# Patient Record
Sex: FEMALE | Race: WHITE | Employment: FULL TIME | URBAN - METROPOLITAN AREA
[De-identification: names, ages, dates, MRNs, and addresses within clinical notes are randomized per-mention and may not be internally consistent; named-entity substitution may affect disease eponyms.]

---

## 2017-08-18 PROBLEM — R06.02 SHORTNESS OF BREATH: Status: ACTIVE | Noted: 2017-08-18

## 2017-08-18 PROBLEM — I10 HTN (HYPERTENSION), BENIGN: Status: ACTIVE | Noted: 2017-08-18

## 2017-08-18 PROBLEM — N28.9 RENAL INSUFFICIENCY: Status: ACTIVE | Noted: 2017-08-18

## 2017-08-18 PROBLEM — R07.1 CHEST PAIN ON BREATHING: Status: ACTIVE | Noted: 2017-08-18

## 2017-08-18 PROBLEM — E11.51 TYPE 2 DIABETES MELLITUS WITH DIABETIC PERIPHERAL ANGIOPATHY WITHOUT GANGRENE, WITHOUT LONG-TERM CURRENT USE OF INSULIN (HCC): Status: ACTIVE | Noted: 2017-08-18

## 2018-01-26 PROBLEM — E78.2 MIXED HYPERLIPIDEMIA: Status: ACTIVE | Noted: 2018-01-26

## 2018-01-26 PROBLEM — I50.32 CHRONIC DIASTOLIC (CONGESTIVE) HEART FAILURE (HCC): Status: ACTIVE | Noted: 2018-01-26

## 2018-01-26 PROBLEM — E66.01 MORBID OBESITY (HCC): Status: ACTIVE | Noted: 2018-01-26

## 2018-05-07 ENCOUNTER — APPOINTMENT (OUTPATIENT)
Dept: ULTRASOUND IMAGING | Age: 63
End: 2018-05-07
Attending: EMERGENCY MEDICINE
Payer: COMMERCIAL

## 2018-05-07 ENCOUNTER — APPOINTMENT (OUTPATIENT)
Dept: NUCLEAR MEDICINE | Facility: HOSPITAL | Age: 63
End: 2018-05-07
Attending: INTERNAL MEDICINE
Payer: COMMERCIAL

## 2018-05-07 ENCOUNTER — APPOINTMENT (OUTPATIENT)
Dept: GENERAL RADIOLOGY | Age: 63
End: 2018-05-07
Attending: EMERGENCY MEDICINE
Payer: COMMERCIAL

## 2018-05-07 ENCOUNTER — HOSPITAL ENCOUNTER (OUTPATIENT)
Facility: HOSPITAL | Age: 63
Setting detail: OBSERVATION
Discharge: HOME | End: 2018-05-08
Attending: EMERGENCY MEDICINE | Admitting: HOSPITALIST
Payer: COMMERCIAL

## 2018-05-07 DIAGNOSIS — J40 BRONCHITIS: Primary | ICD-10-CM

## 2018-05-07 DIAGNOSIS — R07.89 CHEST PAIN, ATYPICAL: ICD-10-CM

## 2018-05-07 DIAGNOSIS — D64.9 ANEMIA, UNSPECIFIED TYPE: ICD-10-CM

## 2018-05-07 DIAGNOSIS — R79.89 ELEVATED D-DIMER: ICD-10-CM

## 2018-05-07 DIAGNOSIS — N28.9 RENAL INSUFFICIENCY: ICD-10-CM

## 2018-05-07 DIAGNOSIS — M79.672 LEFT FOOT PAIN: ICD-10-CM

## 2018-05-07 DIAGNOSIS — J98.01 BRONCHOSPASM: ICD-10-CM

## 2018-05-07 DIAGNOSIS — E66.01 MORBID OBESITY (HCC): ICD-10-CM

## 2018-05-07 DIAGNOSIS — E11.65 TYPE 2 DIABETES MELLITUS WITH HYPERGLYCEMIA, UNSPECIFIED WHETHER LONG TERM INSULIN USE (HCC): ICD-10-CM

## 2018-05-07 PROBLEM — I10 BENIGN ESSENTIAL HTN: Status: ACTIVE | Noted: 2017-08-18

## 2018-05-07 PROCEDURE — 99220 INITIAL OBSERVATION CARE,LEVL III: CPT | Performed by: INTERNAL MEDICINE

## 2018-05-07 PROCEDURE — 78582 LUNG VENTILAT&PERFUS IMAGING: CPT | Performed by: INTERNAL MEDICINE

## 2018-05-07 PROCEDURE — 71045 X-RAY EXAM CHEST 1 VIEW: CPT | Performed by: EMERGENCY MEDICINE

## 2018-05-07 PROCEDURE — 93970 EXTREMITY STUDY: CPT | Performed by: EMERGENCY MEDICINE

## 2018-05-07 PROCEDURE — 73630 X-RAY EXAM OF FOOT: CPT | Performed by: EMERGENCY MEDICINE

## 2018-05-07 RX ORDER — FUROSEMIDE 40 MG/1
40 TABLET ORAL
Status: DISCONTINUED | OUTPATIENT
Start: 2018-05-07 | End: 2018-05-08

## 2018-05-07 RX ORDER — ALBUTEROL SULFATE 90 UG/1
AEROSOL, METERED RESPIRATORY (INHALATION) EVERY 6 HOURS PRN
COMMUNITY
End: 2019-08-02 | Stop reason: ALTCHOICE

## 2018-05-07 RX ORDER — ONDANSETRON 2 MG/ML
4 INJECTION INTRAMUSCULAR; INTRAVENOUS EVERY 6 HOURS PRN
Status: DISCONTINUED | OUTPATIENT
Start: 2018-05-07 | End: 2018-05-08

## 2018-05-07 RX ORDER — FERROUS SULFATE TAB EC 324 MG (65 MG FE EQUIVALENT) 324 (65 FE) MG
TABLET DELAYED RESPONSE ORAL
COMMUNITY
End: 2021-08-20 | Stop reason: ALTCHOICE

## 2018-05-07 RX ORDER — ACETAMINOPHEN 325 MG/1
650 TABLET ORAL EVERY 6 HOURS PRN
Status: DISCONTINUED | OUTPATIENT
Start: 2018-05-07 | End: 2018-05-08

## 2018-05-07 RX ORDER — METOCLOPRAMIDE HYDROCHLORIDE 5 MG/ML
10 INJECTION INTRAMUSCULAR; INTRAVENOUS EVERY 8 HOURS PRN
Status: DISCONTINUED | OUTPATIENT
Start: 2018-05-07 | End: 2018-05-08

## 2018-05-07 RX ORDER — IBUPROFEN 400 MG/1
400 TABLET ORAL EVERY 4 HOURS PRN
Status: DISCONTINUED | OUTPATIENT
Start: 2018-05-07 | End: 2018-05-07

## 2018-05-07 RX ORDER — IBUPROFEN 400 MG/1
200 TABLET ORAL EVERY 4 HOURS PRN
Status: DISCONTINUED | OUTPATIENT
Start: 2018-05-07 | End: 2018-05-07

## 2018-05-07 RX ORDER — CLONIDINE HYDROCHLORIDE 0.2 MG/1
0.3 TABLET ORAL 3 TIMES DAILY
Status: DISCONTINUED | OUTPATIENT
Start: 2018-05-07 | End: 2018-05-08

## 2018-05-07 RX ORDER — IPRATROPIUM BROMIDE AND ALBUTEROL SULFATE 2.5; .5 MG/3ML; MG/3ML
3 SOLUTION RESPIRATORY (INHALATION) EVERY 30 MIN PRN
Status: DISCONTINUED | OUTPATIENT
Start: 2018-05-07 | End: 2018-05-08

## 2018-05-07 RX ORDER — HYDROMORPHONE HYDROCHLORIDE 1 MG/ML
0.5 INJECTION, SOLUTION INTRAMUSCULAR; INTRAVENOUS; SUBCUTANEOUS EVERY 30 MIN PRN
Status: DISCONTINUED | OUTPATIENT
Start: 2018-05-07 | End: 2018-05-08

## 2018-05-07 RX ORDER — OXYBUTYNIN CHLORIDE 5 MG/1
5 TABLET ORAL 3 TIMES DAILY
Status: DISCONTINUED | OUTPATIENT
Start: 2018-05-07 | End: 2018-05-08

## 2018-05-07 RX ORDER — IBUPROFEN 400 MG/1
600 TABLET ORAL EVERY 4 HOURS PRN
Status: DISCONTINUED | OUTPATIENT
Start: 2018-05-07 | End: 2018-05-07

## 2018-05-07 RX ORDER — ENOXAPARIN SODIUM 150 MG/ML
120 INJECTION SUBCUTANEOUS ONCE
Status: COMPLETED | OUTPATIENT
Start: 2018-05-07 | End: 2018-05-07

## 2018-05-07 RX ORDER — FLUTICASONE PROPIONATE 50 MCG
1 SPRAY, SUSPENSION (ML) NASAL DAILY
Status: DISCONTINUED | OUTPATIENT
Start: 2018-05-08 | End: 2018-05-08

## 2018-05-07 RX ORDER — CARVEDILOL 12.5 MG/1
12.5 TABLET ORAL
Status: DISCONTINUED | OUTPATIENT
Start: 2018-05-07 | End: 2018-05-08

## 2018-05-07 RX ORDER — METHYLPREDNISOLONE SODIUM SUCCINATE 125 MG/2ML
80 INJECTION, POWDER, LYOPHILIZED, FOR SOLUTION INTRAMUSCULAR; INTRAVENOUS EVERY 8 HOURS
Status: DISCONTINUED | OUTPATIENT
Start: 2018-05-07 | End: 2018-05-08

## 2018-05-07 RX ORDER — GARLIC EXTRACT 500 MG
1 CAPSULE ORAL 2 TIMES DAILY
Status: DISCONTINUED | OUTPATIENT
Start: 2018-05-07 | End: 2018-05-08

## 2018-05-07 RX ORDER — HEPARIN SODIUM 5000 [USP'U]/ML
7500 INJECTION, SOLUTION INTRAVENOUS; SUBCUTANEOUS EVERY 8 HOURS SCHEDULED
Status: DISCONTINUED | OUTPATIENT
Start: 2018-05-07 | End: 2018-05-08

## 2018-05-07 RX ORDER — SODIUM CHLORIDE 9 MG/ML
125 INJECTION, SOLUTION INTRAVENOUS CONTINUOUS
Status: DISCONTINUED | OUTPATIENT
Start: 2018-05-07 | End: 2018-05-08

## 2018-05-07 RX ORDER — LOSARTAN POTASSIUM 50 MG/1
50 TABLET ORAL DAILY
Status: DISCONTINUED | OUTPATIENT
Start: 2018-05-07 | End: 2018-05-08

## 2018-05-07 RX ORDER — BUPROPION HYDROCHLORIDE 150 MG/1
150 TABLET, EXTENDED RELEASE ORAL 2 TIMES DAILY
Status: DISCONTINUED | OUTPATIENT
Start: 2018-05-07 | End: 2018-05-08

## 2018-05-07 RX ORDER — MONTELUKAST SODIUM 10 MG/1
10 TABLET ORAL NIGHTLY
Status: DISCONTINUED | OUTPATIENT
Start: 2018-05-07 | End: 2018-05-08

## 2018-05-07 RX ORDER — HYDRALAZINE HYDROCHLORIDE 50 MG/1
100 TABLET, FILM COATED ORAL EVERY 8 HOURS SCHEDULED
Status: DISCONTINUED | OUTPATIENT
Start: 2018-05-07 | End: 2018-05-08

## 2018-05-07 RX ORDER — METHYLPREDNISOLONE SODIUM SUCCINATE 125 MG/2ML
125 INJECTION, POWDER, LYOPHILIZED, FOR SOLUTION INTRAMUSCULAR; INTRAVENOUS ONCE
Status: COMPLETED | OUTPATIENT
Start: 2018-05-07 | End: 2018-05-07

## 2018-05-07 RX ORDER — DEXTROSE MONOHYDRATE 25 G/50ML
50 INJECTION, SOLUTION INTRAVENOUS
Status: DISCONTINUED | OUTPATIENT
Start: 2018-05-07 | End: 2018-05-08

## 2018-05-07 RX ORDER — IPRATROPIUM BROMIDE AND ALBUTEROL SULFATE 2.5; .5 MG/3ML; MG/3ML
3 SOLUTION RESPIRATORY (INHALATION) EVERY 4 HOURS
Status: DISCONTINUED | OUTPATIENT
Start: 2018-05-07 | End: 2018-05-08

## 2018-05-07 RX ORDER — ASPIRIN 81 MG/1
81 TABLET ORAL DAILY
Status: DISCONTINUED | OUTPATIENT
Start: 2018-05-08 | End: 2018-05-08

## 2018-05-07 RX ORDER — HYDROCODONE BITARTRATE AND ACETAMINOPHEN 10; 325 MG/1; MG/1
1 TABLET ORAL 2 TIMES DAILY PRN
Status: DISCONTINUED | OUTPATIENT
Start: 2018-05-07 | End: 2018-05-08

## 2018-05-07 NOTE — PROGRESS NOTES
[facility] Progress Note:  Anticoagulation Weight Dose Adjustment for heparin    Leticia Huang is a 66year old female who has been prescribed heparin for VTE prophylaxis.       Estimated Creatinine Clearance: 23.2 mL/min (A) (based on SCr of 2

## 2018-05-07 NOTE — ED PROVIDER NOTES
Patient Seen in: THE [facility] Emergency Department In [place]    History   Patient presents with:  Chest Pain Angina (cardiovascular)    Stated Complaint: CHEST PAIN AND DENNY     HPI    Patient has a history of COPD as well as diabetes, morbid obesity, renal HPI.  Constitutional and vital signs reviewed. All other systems reviewed and negative except as noted above.     Physical Exam   ED Triage Vitals [05/07/18 1259]  BP: (!) 161/54  Pulse: 74  Resp: 20  Temp: 98.2 °F (36.8 °C)  Temp src: Oral  SpO2: 100 FEU = Fibrinogen Equivalent Units.     D-Dimer results of less than 0.5 ug/mL (FEU) have been shown to contribute to the exclusion of venous thromboembolism with a negative predictive value of approximately 95% when results are used as part of the total cli With her calf pain and swelling DVT and pulmonary embolism will need to be excluded. I suspect patient may have gout in the foot. Occult fracture is also considered. Patient treated with IV fluid, DuoNeb's, and Solu-Medrol.   She was offered narcotic p Prescribed:  Current Discharge Medication List        Present on Admission           ICD-10-CM Noted POA    Bronchitis J40 5/7/2018 Unknown

## 2018-05-07 NOTE — PROGRESS NOTES
NURSING ADMISSION NOTE      Patient admitted via Wheelchair  Oriented to room. Safety precautions initiated. Bed in low position. Call light in reach.     Admission navigator complete

## 2018-05-07 NOTE — H&P
[facility] HOSPITAL[facility]  History and Physical     Shayla Delaware Patient Status:  Emergency    1955 MRN [id number]   Location [address] [facility] Attending Chucho Evans MD   Hosp Day # 0 PCP Chetna Gonzalez MD     Ch Breath Activated 1 puff as needed. Disp:  Rfl:    CloNIDine HCl 0.3 MG Oral Tab Take 0.3 mg by mouth 3 (three) times daily. Disp:  Rfl:    FLUTICASONE PROPIONATE NA by Nasal route as needed.  Disp:  Rfl:    carvedilol 12.5 MG Oral Tab Take 1 tablet (12.5 bruits. Respiratory: decreased BS, minimal wheezes  Cardiovascular: S1, S2. Regular rate and rhythm. No murmurs, rubs or gallops. Equal pulses. Chest and Back: No tenderness or deformity. Abdomen: Obese, Soft, nontender, nondistended.   Positive bowel s ED  · CODE status: FUll  · Painter: none    Plan of care discussed with patient    Darin Haider MD  5/7/2018

## 2018-05-07 NOTE — ED INITIAL ASSESSMENT (HPI)
CHEST PAIN WITH DIFF BREATHING-- JUST FINISHED ANTIBIOTIC FOR URI A COUPLE OF DAYS AGO AND SYMPTOMS RETURNED YESTERDAY

## 2018-05-08 VITALS
BODY MASS INDEX: 44.41 KG/M2 | SYSTOLIC BLOOD PRESSURE: 143 MMHG | DIASTOLIC BLOOD PRESSURE: 55 MMHG | OXYGEN SATURATION: 96 % | TEMPERATURE: 98 F | HEART RATE: 72 BPM | HEIGHT: 68 IN | WEIGHT: 293 LBS | RESPIRATION RATE: 21 BRPM

## 2018-05-08 PROCEDURE — 99217 OBSERVATION CARE DISCHARGE: CPT | Performed by: HOSPITALIST

## 2018-05-08 RX ORDER — METHYLPREDNISOLONE 4 MG/1
TABLET ORAL
Qty: 21 TABLET | Refills: 0 | Status: SHIPPED | OUTPATIENT
Start: 2018-05-08 | End: 2018-08-03

## 2018-05-08 RX ORDER — METOCLOPRAMIDE HYDROCHLORIDE 5 MG/ML
5 INJECTION INTRAMUSCULAR; INTRAVENOUS EVERY 8 HOURS PRN
Status: DISCONTINUED | OUTPATIENT
Start: 2018-05-08 | End: 2018-05-08

## 2018-05-08 RX ORDER — PREDNISONE 10 MG/1
TABLET ORAL
Qty: 30 TABLET | Refills: 0 | Status: SHIPPED | OUTPATIENT
Start: 2018-05-08 | End: 2018-08-03

## 2018-05-08 RX ORDER — PREDNISONE 20 MG/1
40 TABLET ORAL
Status: DISCONTINUED | OUTPATIENT
Start: 2018-05-09 | End: 2018-05-08

## 2018-05-08 RX ORDER — GLIMEPIRIDE 2 MG/1
TABLET ORAL
Refills: 0 | Status: SHIPPED | COMMUNITY
Start: 2018-05-08

## 2018-05-08 NOTE — CM/SW NOTE
SW received protocol order for home health. Pt was screened during rounds and no needs are identified at this time. RN to contact SW/CM if needs arise. 05/08/18 1100   CM/SW Screening   Referral [phone] [facility] staff; Chart

## 2018-05-08 NOTE — RESPIRATORY THERAPY NOTE
MOHAN - Equipment Use Daily Summary:                  . Set Mode: CPAP WITH C-FLEX                . Usage in hours: 0:51                . 90% Pressure (EPAP) level: 16                . 90% Insp. Pressure (IPAP): Jean Dickey AHI: 0                .  Supple

## 2018-05-08 NOTE — BH LEVEL OF CARE ASSESSMENT
Level of Care Assessment Note    General Questions  Precipitating Events: Patient was referred to courtney liaison due to grief over the loss of her dog/stress. History of Present Illness:  The pt admits to a history of stress due to her job, finances and her h Sadness; Optimistic;Depressed;Calm;Stressed  Appropriateness of Affect: Appropriate to situation  Range of Affect: Normal  Stability of Affect: Stable  Attitude toward staff: Co-operative;Open;Pleasant  Speech  Rate of Speech: Appropriate  Flow of Speech: A

## 2018-05-08 NOTE — PROGRESS NOTES
NURSING DISCHARGE NOTE    Discharged Home via Wheelchair. Accompanied by Spouse  Belongings Taken by patient/family. Discharge paperwork reviewed with pt and spouse. All questions answered.  Verbalized understanding  All belongings taken with patien

## 2018-05-08 NOTE — PROGRESS NOTES
[facility]  Progress Note     Kieran Silvestre Patient Status:  Observation    1955 MRN [id number]   [facility] 3NE-A Attending Carolina Fowler MD   Hosp Day # 0 PCP Arthur Castañeda MD     Chief Complaint: SOB    S: Patie 1318   TROP  <0.046            Imaging: Imaging data reviewed in Epic.     Medications:   • aspirin  81 mg Oral Daily   • BuPROPion HCl ER (SR)  150 mg Oral BID   • carvedilol  12.5 mg Oral 2x Daily(Beta Blocker)   • CloNIDine HCl  0.3 mg Oral TID   • Fluti of care:   VQ- low prob   US neg DVT baker's cyst left   RVP neg  Blood cx P   Sputum P   IV steroids  Consider decreasing dose   Psych liaison to see     Estimated date of discharge: TBD  Discharge is dependent on: improvement in breathing  At this point

## 2018-05-08 NOTE — PLAN OF CARE
Patient is A&Ox4  Complained of L foot pain  No n/v/d  Uses a motorized wheelchair   IVF infusing  On droplet iso to r/o flu - respiratory panel is still pending  Blood culture pending  Afebrile, VSS  NSR on tele; on room air  MOHAN - CPAP at night  Will con

## 2018-05-09 NOTE — DISCHARGE SUMMARY
[facility]OSPITAL[facility]  DISCHARGE SUMMARY     Glenna Dueñas Patient Status:  Observation    1955 MRN [id number]   [facility] 3NE-A Attending No att. providers found   Hosp Day # 0 PCP Brittney [facility] MD     Date of Admission:  symtpoms and came if as she continues to have cough, chest pressure and SOB. She denies any CP with exertion. She denies any fevers or chills. She denies any recent travel but does complain of L foot pain.  She states it feels similar to when she had gout i around her own house. Is unable to walk from car to store entrance. Discharged home today. She did quit smoking almost 1 year ago 1 positive for her, out of herself that she was able to quit after many years of tobacco dependency.         Procedures du Take 20 mg by mouth daily. Refills:  0     BuPROPion HCl ER (SR) 150 MG Tb12  Commonly known as:  WELLBUTRIN SR      Take 1 tablet by mouth 2 (two) times daily.    Refills:  0     carvedilol 12.5 MG Tabs  Commonly known as:  COREG      Take 1 tablet (12.5 These medications were sent to [facility], [address], [address], [address]    Phone:  [phone]   · methylPREDNISolone 4 MG Tbpk     Please  your prescriptio

## 2019-08-02 PROBLEM — I10 HTN (HYPERTENSION), BENIGN: Status: ACTIVE | Noted: 2019-08-02

## 2019-08-02 PROBLEM — N18.6 ESRD (END STAGE RENAL DISEASE) (HCC): Status: ACTIVE | Noted: 2019-08-02

## 2020-11-17 PROBLEM — Z01.810 PREOP CARDIOVASCULAR EXAM: Status: ACTIVE | Noted: 2020-11-17

## 2025-04-04 ENCOUNTER — HOSPITAL ENCOUNTER (EMERGENCY)
Facility: HOSPITAL | Age: 70
Discharge: STILL ADMITTED OR ADMINISTRATIVE | End: 2025-04-04
Payer: COMMERCIAL

## 2025-04-04 ENCOUNTER — HOSPITAL ENCOUNTER (INPATIENT)
Facility: HOSPITAL | Age: 70
LOS: 5 days | Discharge: NURSING, REHAB OR LONG-TERM CARE | End: 2025-04-11
Attending: EMERGENCY MEDICINE | Admitting: STUDENT IN AN ORGANIZED HEALTH CARE EDUCATION/TRAINING PROGRAM
Payer: MEDICARE

## 2025-04-04 ENCOUNTER — APPOINTMENT (OUTPATIENT)
Dept: CT IMAGING | Facility: HOSPITAL | Age: 70
End: 2025-04-04
Attending: EMERGENCY MEDICINE
Payer: MEDICARE

## 2025-04-04 VITALS
HEIGHT: 68 IN | OXYGEN SATURATION: 95 % | SYSTOLIC BLOOD PRESSURE: 174 MMHG | TEMPERATURE: 99 F | DIASTOLIC BLOOD PRESSURE: 54 MMHG | HEART RATE: 101 BPM | BODY MASS INDEX: 22.73 KG/M2 | WEIGHT: 150 LBS | RESPIRATION RATE: 17 BRPM

## 2025-04-04 DIAGNOSIS — L03.115 CELLULITIS OF RIGHT LOWER EXTREMITY: Primary | ICD-10-CM

## 2025-04-04 DIAGNOSIS — R10.9 ABDOMINAL PAIN OF UNKNOWN ETIOLOGY: ICD-10-CM

## 2025-04-04 LAB
ALBUMIN SERPL-MCNC: 4 G/DL (ref 3.2–4.8)
ALBUMIN/GLOB SERPL: 1.4 {RATIO} (ref 1–2)
ALP LIVER SERPL-CCNC: 71 U/L
ALT SERPL-CCNC: 13 U/L
ANION GAP SERPL CALC-SCNC: 8 MMOL/L (ref 0–18)
AST SERPL-CCNC: 15 U/L (ref ?–34)
BASOPHILS # BLD AUTO: 0.03 X10(3) UL (ref 0–0.2)
BASOPHILS NFR BLD AUTO: 0.2 %
BILIRUB SERPL-MCNC: <0.2 MG/DL (ref 0.2–1.1)
BUN BLD-MCNC: 29 MG/DL (ref 9–23)
CALCIUM BLD-MCNC: 10.5 MG/DL (ref 8.7–10.6)
CHLORIDE SERPL-SCNC: 99 MMOL/L (ref 98–112)
CO2 SERPL-SCNC: 29 MMOL/L (ref 21–32)
CREAT BLD-MCNC: 3.57 MG/DL
EGFRCR SERPLBLD CKD-EPI 2021: 13 ML/MIN/1.73M2 (ref 60–?)
EOSINOPHIL # BLD AUTO: 0.08 X10(3) UL (ref 0–0.7)
EOSINOPHIL NFR BLD AUTO: 0.6 %
ERYTHROCYTE [DISTWIDTH] IN BLOOD BY AUTOMATED COUNT: 15.1 %
ETHANOL SERPL-MCNC: 3 MG/DL (ref ?–3)
FLUAV + FLUBV RNA SPEC NAA+PROBE: NEGATIVE
FLUAV + FLUBV RNA SPEC NAA+PROBE: NEGATIVE
GLOBULIN PLAS-MCNC: 2.9 G/DL (ref 2–3.5)
GLUCOSE BLD-MCNC: 120 MG/DL (ref 70–99)
HCT VFR BLD AUTO: 34.1 %
HGB BLD-MCNC: 10.6 G/DL
IMM GRANULOCYTES # BLD AUTO: 0.09 X10(3) UL (ref 0–1)
IMM GRANULOCYTES NFR BLD: 0.7 %
LACTATE SERPL-SCNC: 1 MMOL/L (ref 0.5–2)
LIPASE SERPL-CCNC: 30 U/L (ref 12–53)
LYMPHOCYTES # BLD AUTO: 0.19 X10(3) UL (ref 1–4)
LYMPHOCYTES NFR BLD AUTO: 1.5 %
MCH RBC QN AUTO: 32.2 PG (ref 26–34)
MCHC RBC AUTO-ENTMCNC: 31.1 G/DL (ref 31–37)
MCV RBC AUTO: 103.6 FL
MONOCYTES # BLD AUTO: 0.62 X10(3) UL (ref 0.1–1)
MONOCYTES NFR BLD AUTO: 4.8 %
NEUTROPHILS # BLD AUTO: 11.95 X10 (3) UL (ref 1.5–7.7)
NEUTROPHILS # BLD AUTO: 11.95 X10(3) UL (ref 1.5–7.7)
NEUTROPHILS NFR BLD AUTO: 92.2 %
OSMOLALITY SERPL CALC.SUM OF ELEC: 289 MOSM/KG (ref 275–295)
PLATELET # BLD AUTO: 248 10(3)UL (ref 150–450)
POTASSIUM SERPL-SCNC: 5.1 MMOL/L (ref 3.5–5.1)
PROT SERPL-MCNC: 6.9 G/DL (ref 5.7–8.2)
RBC # BLD AUTO: 3.29 X10(6)UL
RSV RNA SPEC NAA+PROBE: NEGATIVE
SARS-COV-2 RNA RESP QL NAA+PROBE: NOT DETECTED
SODIUM SERPL-SCNC: 136 MMOL/L (ref 136–145)
WBC # BLD AUTO: 13 X10(3) UL (ref 4–11)

## 2025-04-04 PROCEDURE — 83605 ASSAY OF LACTIC ACID: CPT

## 2025-04-04 PROCEDURE — 74176 CT ABD & PELVIS W/O CONTRAST: CPT | Performed by: EMERGENCY MEDICINE

## 2025-04-04 RX ORDER — ONDANSETRON 2 MG/ML
4 INJECTION INTRAMUSCULAR; INTRAVENOUS ONCE
Status: DISCONTINUED | OUTPATIENT
Start: 2025-04-04 | End: 2025-04-11

## 2025-04-05 ENCOUNTER — APPOINTMENT (OUTPATIENT)
Dept: CT IMAGING | Facility: HOSPITAL | Age: 70
End: 2025-04-05
Attending: INTERNAL MEDICINE
Payer: MEDICARE

## 2025-04-05 PROBLEM — R10.9 ABDOMINAL PAIN OF UNKNOWN ETIOLOGY: Status: ACTIVE | Noted: 2025-04-05

## 2025-04-05 PROBLEM — N18.6 ANEMIA IN CHRONIC KIDNEY DISEASE, ON CHRONIC DIALYSIS (HCC): Status: ACTIVE | Noted: 2025-04-05

## 2025-04-05 PROBLEM — I10 HYPERTENSION: Status: ACTIVE | Noted: 2025-04-05

## 2025-04-05 PROBLEM — Z99.2 ANEMIA IN CHRONIC KIDNEY DISEASE, ON CHRONIC DIALYSIS (HCC): Status: ACTIVE | Noted: 2025-04-05

## 2025-04-05 PROBLEM — D63.1 ANEMIA IN CHRONIC KIDNEY DISEASE, ON CHRONIC DIALYSIS (HCC): Status: ACTIVE | Noted: 2025-04-05

## 2025-04-05 LAB
ANION GAP SERPL CALC-SCNC: 6 MMOL/L (ref 0–18)
ATRIAL RATE: 95 BPM
BASOPHILS # BLD AUTO: 0.04 X10(3) UL (ref 0–0.2)
BASOPHILS NFR BLD AUTO: 0.4 %
BUN BLD-MCNC: 35 MG/DL (ref 9–23)
CALCIUM BLD-MCNC: 10.7 MG/DL (ref 8.7–10.6)
CHLORIDE SERPL-SCNC: 102 MMOL/L (ref 98–112)
CO2 SERPL-SCNC: 30 MMOL/L (ref 21–32)
CREAT BLD-MCNC: 4.01 MG/DL
CRP SERPL-MCNC: 11.6 MG/DL (ref ?–0.5)
EGFRCR SERPLBLD CKD-EPI 2021: 12 ML/MIN/1.73M2 (ref 60–?)
EOSINOPHIL # BLD AUTO: 0.29 X10(3) UL (ref 0–0.7)
EOSINOPHIL NFR BLD AUTO: 3.1 %
ERYTHROCYTE [DISTWIDTH] IN BLOOD BY AUTOMATED COUNT: 15 %
ERYTHROCYTE [SEDIMENTATION RATE] IN BLOOD: 74 MM/HR
EST. AVERAGE GLUCOSE BLD GHB EST-MCNC: 94 MG/DL (ref 68–126)
GLUCOSE BLD-MCNC: 101 MG/DL (ref 70–99)
GLUCOSE BLD-MCNC: 114 MG/DL (ref 70–99)
GLUCOSE BLD-MCNC: 121 MG/DL (ref 70–99)
GLUCOSE BLD-MCNC: 121 MG/DL (ref 70–99)
GLUCOSE BLD-MCNC: 84 MG/DL (ref 70–99)
GLUCOSE BLD-MCNC: 95 MG/DL (ref 70–99)
HBA1C MFR BLD: 4.9 % (ref ?–5.7)
HCT VFR BLD AUTO: 30.8 %
HGB BLD-MCNC: 9.6 G/DL
IMM GRANULOCYTES # BLD AUTO: 0.08 X10(3) UL (ref 0–1)
IMM GRANULOCYTES NFR BLD: 0.8 %
LYMPHOCYTES # BLD AUTO: 0.25 X10(3) UL (ref 1–4)
LYMPHOCYTES NFR BLD AUTO: 2.6 %
MCH RBC QN AUTO: 32.4 PG (ref 26–34)
MCHC RBC AUTO-ENTMCNC: 31.2 G/DL (ref 31–37)
MCV RBC AUTO: 104.1 FL
MONOCYTES # BLD AUTO: 0.6 X10(3) UL (ref 0.1–1)
MONOCYTES NFR BLD AUTO: 6.3 %
NEUTROPHILS # BLD AUTO: 8.19 X10 (3) UL (ref 1.5–7.7)
NEUTROPHILS # BLD AUTO: 8.19 X10(3) UL (ref 1.5–7.7)
NEUTROPHILS NFR BLD AUTO: 86.8 %
OSMOLALITY SERPL CALC.SUM OF ELEC: 294 MOSM/KG (ref 275–295)
P AXIS: 72 DEGREES
P-R INTERVAL: 162 MS
PLATELET # BLD AUTO: 200 10(3)UL (ref 150–450)
POTASSIUM SERPL-SCNC: 5.2 MMOL/L (ref 3.5–5.1)
Q-T INTERVAL: 322 MS
QRS DURATION: 72 MS
QTC CALCULATION (BEZET): 404 MS
R AXIS: -71 DEGREES
RBC # BLD AUTO: 2.96 X10(6)UL
SODIUM SERPL-SCNC: 138 MMOL/L (ref 136–145)
T AXIS: 84 DEGREES
VENTRICULAR RATE: 95 BPM
WBC # BLD AUTO: 9.5 X10(3) UL (ref 4–11)

## 2025-04-05 PROCEDURE — 73701 CT LOWER EXTREMITY W/DYE: CPT | Performed by: INTERNAL MEDICINE

## 2025-04-05 PROCEDURE — 99223 1ST HOSP IP/OBS HIGH 75: CPT | Performed by: STUDENT IN AN ORGANIZED HEALTH CARE EDUCATION/TRAINING PROGRAM

## 2025-04-05 PROCEDURE — 99222 1ST HOSP IP/OBS MODERATE 55: CPT | Performed by: INTERNAL MEDICINE

## 2025-04-05 PROCEDURE — 99233 SBSQ HOSP IP/OBS HIGH 50: CPT | Performed by: INTERNAL MEDICINE

## 2025-04-05 RX ORDER — BISACODYL 10 MG
10 SUPPOSITORY, RECTAL RECTAL
Status: DISCONTINUED | OUTPATIENT
Start: 2025-04-05 | End: 2025-04-11

## 2025-04-05 RX ORDER — METOCLOPRAMIDE HYDROCHLORIDE 5 MG/ML
5 INJECTION INTRAMUSCULAR; INTRAVENOUS EVERY 8 HOURS PRN
Status: DISCONTINUED | OUTPATIENT
Start: 2025-04-05 | End: 2025-04-08

## 2025-04-05 RX ORDER — HEPARIN SODIUM 5000 [USP'U]/ML
5000 INJECTION, SOLUTION INTRAVENOUS; SUBCUTANEOUS EVERY 12 HOURS SCHEDULED
Status: DISCONTINUED | OUTPATIENT
Start: 2025-04-05 | End: 2025-04-11

## 2025-04-05 RX ORDER — ALLOPURINOL 100 MG/1
100 TABLET ORAL DAILY
Status: DISCONTINUED | OUTPATIENT
Start: 2025-04-05 | End: 2025-04-11

## 2025-04-05 RX ORDER — DIPHENHYDRAMINE HCL 25 MG
25 CAPSULE ORAL EVERY 6 HOURS PRN
COMMUNITY

## 2025-04-05 RX ORDER — PANTOPRAZOLE SODIUM 40 MG/1
40 TABLET, DELAYED RELEASE ORAL
Status: DISCONTINUED | OUTPATIENT
Start: 2025-04-05 | End: 2025-04-11

## 2025-04-05 RX ORDER — LORATADINE 10 MG/1
10 TABLET, ORALLY DISINTEGRATING ORAL DAILY
COMMUNITY

## 2025-04-05 RX ORDER — ONDANSETRON 4 MG/1
4 TABLET, ORALLY DISINTEGRATING ORAL EVERY 8 HOURS PRN
COMMUNITY

## 2025-04-05 RX ORDER — NICOTINE POLACRILEX 4 MG
15 LOZENGE BUCCAL
Status: DISCONTINUED | OUTPATIENT
Start: 2025-04-05 | End: 2025-04-11

## 2025-04-05 RX ORDER — SEVELAMER CARBONATE 800 MG/1
1600 TABLET, FILM COATED ORAL
Status: DISCONTINUED | OUTPATIENT
Start: 2025-04-05 | End: 2025-04-11

## 2025-04-05 RX ORDER — DEXTROSE MONOHYDRATE 25 G/50ML
50 INJECTION, SOLUTION INTRAVENOUS
Status: DISCONTINUED | OUTPATIENT
Start: 2025-04-05 | End: 2025-04-11

## 2025-04-05 RX ORDER — SODIUM CHLORIDE 1 G/1
1 TABLET ORAL
Status: DISCONTINUED | OUTPATIENT
Start: 2025-04-05 | End: 2025-04-11

## 2025-04-05 RX ORDER — NITROFURANTOIN 25; 75 MG/1; MG/1
100 CAPSULE ORAL 2 TIMES DAILY
COMMUNITY
End: 2025-04-11

## 2025-04-05 RX ORDER — AMLODIPINE BESYLATE 5 MG/1
10 TABLET ORAL DAILY
COMMUNITY
End: 2025-04-11

## 2025-04-05 RX ORDER — CARVEDILOL 12.5 MG/1
25 TABLET ORAL 2 TIMES DAILY WITH MEALS
Status: DISCONTINUED | OUTPATIENT
Start: 2025-04-05 | End: 2025-04-11

## 2025-04-05 RX ORDER — HYDRALAZINE HYDROCHLORIDE 25 MG/1
25 TABLET, FILM COATED ORAL 3 TIMES DAILY
COMMUNITY
End: 2025-04-11

## 2025-04-05 RX ORDER — ECHINACEA PURPUREA EXTRACT 125 MG
1 TABLET ORAL
Status: DISCONTINUED | OUTPATIENT
Start: 2025-04-05 | End: 2025-04-11

## 2025-04-05 RX ORDER — GABAPENTIN 100 MG/1
200 CAPSULE ORAL NIGHTLY
COMMUNITY

## 2025-04-05 RX ORDER — FOLIC ACID/VIT B COMPLEX AND C 0.8 MG
1 TABLET ORAL DAILY
COMMUNITY

## 2025-04-05 RX ORDER — LIDOCAINE AND PRILOCAINE 25; 25 MG/G; MG/G
CREAM TOPICAL AS NEEDED
COMMUNITY

## 2025-04-05 RX ORDER — BENZONATATE 200 MG/1
200 CAPSULE ORAL 3 TIMES DAILY PRN
Status: DISCONTINUED | OUTPATIENT
Start: 2025-04-05 | End: 2025-04-11

## 2025-04-05 RX ORDER — POLYETHYLENE GLYCOL 3350 17 G/17G
17 POWDER, FOR SOLUTION ORAL DAILY
COMMUNITY

## 2025-04-05 RX ORDER — DIPHENHYDRAMINE HCL 25 MG
25 CAPSULE ORAL EVERY 6 HOURS PRN
Status: DISCONTINUED | OUTPATIENT
Start: 2025-04-05 | End: 2025-04-11

## 2025-04-05 RX ORDER — ONDANSETRON 2 MG/ML
4 INJECTION INTRAMUSCULAR; INTRAVENOUS EVERY 6 HOURS PRN
Status: DISCONTINUED | OUTPATIENT
Start: 2025-04-05 | End: 2025-04-11

## 2025-04-05 RX ORDER — SENNOSIDES 8.6 MG
8.6 TABLET ORAL 2 TIMES DAILY
COMMUNITY

## 2025-04-05 RX ORDER — OMEPRAZOLE 20 MG/1
40 CAPSULE, DELAYED RELEASE ORAL EVERY MORNING
COMMUNITY

## 2025-04-05 RX ORDER — CARVEDILOL 25 MG/1
25 TABLET ORAL 2 TIMES DAILY WITH MEALS
COMMUNITY

## 2025-04-05 RX ORDER — NYSTATIN 100000 U/G
CREAM TOPICAL 2 TIMES DAILY
Status: DISCONTINUED | OUTPATIENT
Start: 2025-04-05 | End: 2025-04-11

## 2025-04-05 RX ORDER — NICOTINE POLACRILEX 4 MG
30 LOZENGE BUCCAL
Status: DISCONTINUED | OUTPATIENT
Start: 2025-04-05 | End: 2025-04-11

## 2025-04-05 RX ORDER — SENNOSIDES 8.6 MG
8.6 TABLET ORAL 2 TIMES DAILY
Status: DISCONTINUED | OUTPATIENT
Start: 2025-04-05 | End: 2025-04-11

## 2025-04-05 RX ORDER — ACETAMINOPHEN 500 MG
500 TABLET ORAL EVERY 4 HOURS PRN
Status: DISCONTINUED | OUTPATIENT
Start: 2025-04-05 | End: 2025-04-11

## 2025-04-05 RX ORDER — VANCOMYCIN HYDROCHLORIDE
1250 ONCE
Status: COMPLETED | OUTPATIENT
Start: 2025-04-05 | End: 2025-04-06

## 2025-04-05 RX ORDER — GABAPENTIN 100 MG/1
200 CAPSULE ORAL NIGHTLY
Status: DISCONTINUED | OUTPATIENT
Start: 2025-04-05 | End: 2025-04-11

## 2025-04-05 RX ORDER — SENNOSIDES 8.6 MG
17.2 TABLET ORAL NIGHTLY PRN
Status: DISCONTINUED | OUTPATIENT
Start: 2025-04-05 | End: 2025-04-05

## 2025-04-05 RX ORDER — LIDOCAINE AND PRILOCAINE 25; 25 MG/G; MG/G
CREAM TOPICAL AS NEEDED
Status: DISCONTINUED | OUTPATIENT
Start: 2025-04-05 | End: 2025-04-11

## 2025-04-05 RX ORDER — ALBUMIN (HUMAN) 12.5 G/50ML
25 SOLUTION INTRAVENOUS
Status: ACTIVE | OUTPATIENT
Start: 2025-04-05 | End: 2025-04-07

## 2025-04-05 RX ORDER — NYSTATIN 100000 U/G
CREAM TOPICAL 2 TIMES DAILY
COMMUNITY

## 2025-04-05 RX ORDER — BUPROPION HYDROCHLORIDE 150 MG/1
300 TABLET, EXTENDED RELEASE ORAL 2 TIMES DAILY
Status: DISCONTINUED | OUTPATIENT
Start: 2025-04-05 | End: 2025-04-11

## 2025-04-05 RX ORDER — SODIUM CHLORIDE 1 G/1
1 TABLET ORAL
COMMUNITY

## 2025-04-05 RX ORDER — CINACALCET 30 MG/1
30 TABLET, FILM COATED ORAL DAILY
Status: DISCONTINUED | OUTPATIENT
Start: 2025-04-05 | End: 2025-04-11

## 2025-04-05 RX ORDER — POLYETHYLENE GLYCOL 3350 17 G/17G
17 POWDER, FOR SOLUTION ORAL DAILY PRN
Status: DISCONTINUED | OUTPATIENT
Start: 2025-04-05 | End: 2025-04-11

## 2025-04-05 NOTE — PROGRESS NOTES
NURSING ADMISSION NOTE      Patient admitted via Cart  Oriented to room 501  Safety precautions initiated.  Bed in low position.  Call light in reach.

## 2025-04-05 NOTE — PLAN OF CARE
A&OX4. Maintaining O2 on 2L while sleeping. Tele, NSR. Denies pain.w/c bound. PIV SL. Port A cath R chest.AV fistula on LUE. Clear liquid diet. VALENCIA BKA.Wound vac on RLE.No further needs at this time. Continue POC. Safety precaution in place.     Urine specimen collected via straight cath.  Problem: Patient/Family Goals  Goal: Patient/Family Long Term Goal  Description: Patient's Long Term Goal:   Discharge back to facility  Interventions:-   Follow POC  - See additional Care Plan goals for specific interventions  Outcome: Progressing  Goal: Patient/Family Short Term Goal  Description: Patient's Short Term Goal: 4/4 NOC:Rest  Interventions: - cluster care  - See additional Care Plan goals for specific interventions  Outcome: Progressing

## 2025-04-05 NOTE — ED QUICK NOTES
Orders for admission, patient is aware of plan and ready to go upstairs. Any questions, please call ED RN marek at extension Apod.     Patient Covid vaccination status: Fully vaccinated     COVID Test Ordered in ED: SARS-CoV-2/Flu A and B/RSV by PCR (GeneXpert)    COVID Suspicion at Admission: N/A    Running Infusions:  None    Mental Status/LOC at time of transport: a/ox3    Other pertinent information: SEE LAB SUMMARY IN CHART REVIEW FOR MISSING LABS DRAWN IN ER TONOC.  CIWA score: N/A   NIH score:  N/A

## 2025-04-05 NOTE — CONSULTS
[facility]  Report of Consultation    Rosario Mcmullen Patient Status:  Observation    1955 MRN [id number]   Location [facility] 5NW-A Attending Merlene Duncan, DO   Hosp Day # 0 PCP Sal Montalvo MD     Reason for Consultation:  ESRD    History of Present Illness:  Rosario Mcmullen is a a(n) 69 year old female with hx of ESRD on HD, DM, HTN, COPD who is admitted to hospital for evaluation of sepsis related to stump cellutlitis  Hypotensive on admit; BP meds have been ehdl   Patient getting CT RLE w/ contrast to rule out abscess  HD consulted for management of ESRD- she is MWF   She was recently @ hospital in [place] - treated for R BKA stump infection (pseudomonas); subsequently she chose a rehab in this area to be closer to her family and 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     Labs today K 5.2    History:  Past Medical History:    Arthritis    Bronchitis    COPD (chronic obstructive pulmonary disease) ([facility])    Esophageal reflux    ESRD (end stage renal disease) ([facility])    High blood pressure    Kidney disorder    Neuropathy of leg    Renal disorder    Sleep apnea    Type II or unspecified type diabetes mellitus without mention of complication, not stated as uncontrolled    Unspecified essential hypertension     Past Surgical History:   Procedure Laterality Date    Av fistula revision, open      Knee arthroscopy Bilateral      Family History   Problem Relation Age of Onset    Heart Attack Father     Cancer Mother       reports that she quit smoking about 7 years ago. She started smoking about 52 years ago. She has never used smokeless tobacco. She reports current alcohol use. She reports that she does not use drugs.    Allergies:  Allergies[1]    Current Medications:    Current Facility-Administered Medications:     glucose (Dex4) 15 GM/59ML oral liquid 15 g, 15 g, Oral, Q15 Min PRN **OR** glucose (Glutose) 40% oral gel 15 g, 15 g, Oral, Q15 Min PRN **OR**  glucose-vitamin C (Dex-4) chewable tab 4 tablet, 4 tablet, Oral, Q15 Min PRN **OR** dextrose 50% injection 50 mL, 50 mL, Intravenous, Q15 Min PRN **OR** glucose (Dex4) 15 GM/59ML oral liquid 30 g, 30 g, Oral, Q15 Min PRN **OR** glucose (Glutose) 40% oral gel 30 g, 30 g, Oral, Q15 Min PRN **OR** glucose-vitamin C (Dex-4) chewable tab 8 tablet, 8 tablet, Oral, Q15 Min PRN    heparin (Porcine) 5000 UNIT/ML injection 5,000 Units, 5,000 Units, Subcutaneous, 2 times per day    acetaminophen (Tylenol Extra Strength) tab 500 mg, 500 mg, Oral, Q4H PRN    melatonin tab 3 mg, 3 mg, Oral, Nightly PRN    ondansetron (Zofran) 4 MG/2ML injection 4 mg, 4 mg, Intravenous, Q6H PRN    metoclopramide (Reglan) 5 mg/mL injection 5 mg, 5 mg, Intravenous, Q8H PRN    polyethylene glycol (PEG 3350) (Miralax) 17 g oral packet 17 g, 17 g, Oral, Daily PRN    bisacodyl (Dulcolax) 10 MG rectal suppository 10 mg, 10 mg, Rectal, Daily PRN    insulin aspart (NovoLOG) 100 Units/mL FlexPen 2-10 Units, 2-10 Units, Subcutaneous, TID AC and HS    benzonatate (Tessalon) cap 200 mg, 200 mg, Oral, TID PRN    glycerin-hypromellose- (Artificial Tears) 0.2-0.2-1 % ophthalmic solution 1 drop, 1 drop, Both Eyes, QID PRN    sodium chloride (Saline Mist) 0.65 % nasal solution 1 spray, 1 spray, Each Nare, Q3H PRN    ceFEPIme (Maxpime) 2 g in sodium chloride 0.9% 100 mL IVPB-MBP, 2 g, Intravenous, Q24H    sennosides (Senokot) tab 8.6 mg, 8.6 mg, Oral, BID    sodium chloride tab 1 g, 1 g, Oral, TID CC    allopurinol (Zyloprim) tab 100 mg, 100 mg, Oral, Daily    buPROPion SR (WELLBUTRIN SR) 12 hr tab 300 mg, 300 mg, Oral, BID    carvedilol (Coreg) tab 25 mg, 25 mg, Oral, BID with meals    cinacalcet (Sensipar) tab 30 mg, 30 mg, Oral, Daily    diphenhydrAMINE (Benadryl) cap/tab 25 mg, 25 mg, Oral, Q6H PRN    gabapentin (Neurontin) cap 200 mg, 200 mg, Oral, Nightly    nystatin (Mycostatin) 100,000 Units/g cream, , Topical, BID    pantoprazole (Protonix) DR tab 40  mg, 40 mg, Oral, QAM AC    sevelamer carbonate (Renvela) tab 1,600 mg, 1,600 mg, Oral, TID CC    [START ON 4/7/2025] vancomycin (Vancocin) 750 mg in sodium chloride 0.9% 250 mL IVPB-ADDV, 750 mg, Intravenous, Once per day on Monday Wednesday Friday    ondansetron (Zofran) 4 MG/2ML injection 4 mg, 4 mg, Intravenous, Once  Home Medications:  No current outpatient medications on file.       Review of Systems:  See HPI; A total of 12 systems reviewed and otherwise unremarkable.    Physical Exam:  Vital signs: Blood pressure 115/32, pulse 61, temperature 98.5 °F (36.9 °C), temperature source Oral, resp. rate 18, height 5' 5\" (1.651 m), weight 227 lb (103 kg), SpO2 95%.  General: No acute distress. Alert and oriented x 3.  HEENT: Moist mucous membranes. EOM-I. PERRL  Neck: No lymphadenopathy.  No JVD. No carotid bruits.  Respiratory: Clear to auscultation bilaterally.  No wheezes. No rhonchi.  Cardiovascular: S1, S2.  Regular rate and rhythm.  No murmurs. Equal pulses   Abdomen: Soft, nontender, nondistended.  Positive bowel sounds. No rebound tenderness   Neurologic: No focal neurological deficits.   Musculoskeletal: R BKA;     Integument: No lesions. No erythema.  Psychiatric: Appropriate mood and affect.    Laboratory:  Lab Results   Component Value Date    WBC 9.5 04/05/2025    HGB 9.6 04/05/2025    HCT 30.8 04/05/2025    .0 04/05/2025    CREATSERUM 4.01 04/05/2025    BUN 35 04/05/2025     04/05/2025    K 5.2 04/05/2025     04/05/2025    CO2 30.0 04/05/2025    GLU 95 04/05/2025    CA 10.7 04/05/2025    ALB 4.0 04/04/2025    ALKPHO 71 04/04/2025    BILT <0.2 04/04/2025    TP 6.9 04/04/2025    AST 15 04/04/2025    ALT 13 04/04/2025    LIP 30 04/04/2025    ETOH 3 04/04/2025    PGLU 114 04/05/2025          BUN (mg/dL)   Date Value   04/05/2025 35 (H)   04/04/2025 29 (H)   05/08/2018 61 (H)     Creatinine (mg/dL)   Date Value   04/05/2025 4.01 (H)   04/04/2025 3.57 (H)   05/08/2018 2.28 (H)          Imaging:  Reviewed     Impression:  ESRD - on HD ; MWF  Hyperkalemia- mild; monitor  - plan for HD tomorrow  S/p BKA; suspect stump infection- recent tx @ OSH  - noted to have pseudomonas growth  - would care  - abx  - plan for CT today to assess for possible abscess  Anemia due to CKD/chronic disease  ANITA w/ HD  DM  HTN  PAD  Hx of CHF; stable      Thank you for allowing me to participate in this patient's care.  Please feel free to call me with any questions or concerns.    Rudolph Reyes MD  4/5/2025  3:29 PM         [1]   Allergies  Allergen Reactions    Neurontin [Gabapentin]

## 2025-04-05 NOTE — PROGRESS NOTES
[facility]   part of [facility]     Hospitalist Progress Note     Rosario JARAMILLO Reji Gandhililiya Patient Status:  Observation    1955 MRN [id number]   Location [facility] 5NW-A Attending Merlene Duncan,    Hosp Day # 0 PCP Sal Montalvo MD     Chief Complaint: fever, emesis    Subjective:     Patient reports severe pain of R BKA stump. Its more swollen compared to last week. Tmax 100.5.     Objective:    Review of Systems:   A comprehensive review of systems was completed; pertinent positive and negatives stated in subjective.    Vital signs:  Temp:  [98.3 °F (36.8 °C)-100.5 °F (38.1 °C)] 98.3 °F (36.8 °C)  Pulse:  [] 72  Resp:  [15-21] 19  BP: ()/(25-82) 157/54  SpO2:  [92 %-100 %] 99 %    Physical Exam:    General: No acute distress  Respiratory: No wheezes, no rhonchi  Cardiovascular: S1, S2, regular rate and rhythm  Abdomen: Soft, Non-tender, non-distended, positive bowel sounds  Neuro: No new focal deficits.   Extremities: R BKA stump examined, no foul odor, overall tissue appears healthy.     Diagnostic Data:    Labs:  Recent Labs   Lab 25  0637   WBC 13.0* 9.5   HGB 10.6* 9.6*   .6* 104.1*   .0 200.0       Recent Labs   Lab 25  0637   * 95   BUN 29* 35*   CREATSERUM 3.57* 4.01*   CA 10.5 10.7*   ALB 4.0  --     138   K 5.1 5.2*   CL 99 102   CO2 29.0 30.0   ALKPHO 71  --    AST 15  --    ALT 13  --    BILT <0.2*  --    TP 6.9  --        Estimated Glomerular Filtration Rate: 12 mL/min/1.73m2 (A) (result from lab).    No results for input(s): \"TROP\", \"TROPHS\", \"CK\" in the last 168 hours.    No results for input(s): \"PTP\", \"INR\" in the last 168 hours.               Microbiology    No results found for this visit on 25.      Imaging: Reviewed in Epic.    Medications:    heparin  5,000 Units Subcutaneous 2 times per day    insulin aspart  2-10 Units Subcutaneous TID AC and HS    cefepime  2 g Intravenous  Q24H    sennosides  8.6 mg Oral BID    sodium chloride  1 g Oral TID CC    allopurinol  100 mg Oral Daily    buPROPion SR  300 mg Oral BID    carvedilol  25 mg Oral BID with meals    cinacalcet  30 mg Oral Daily    gabapentin  200 mg Oral Nightly    nystatin   Topical BID    pantoprazole  40 mg Oral QAM AC    sevelamer carbonate  1,600 mg Oral TID CC    ondansetron  4 mg Intravenous Once       Assessment & Plan:      #Suspected Right stump Cellulitis  #Sepsis 2/2 above   Patient presented with abdominal pain. COVID, Flu, RSV negative  -CT showing LLQ abdominal wall fat stranding and calcification (on exam no skin erythema here to support abdominal wall cellulitis)  -Follow cultures  -blood cx pending  -antibiotics:cefepime given hx pseudomonas  -MAP dropped to 62 on arrival to floor, Patient did not receive 30ml/kg of fluids despite having: hypotension. The reason for doing so is: patient has heart failure. 500mL of fluids were given instead  -CT RLE BKA to rule out abscess  -Wound care consult     #macrocytic anemia  -hgb 10.6: last 9.1  -no signs acute bleeding  -cont to monitor CBC     #ESRD  -HD MWF  -if still in hospital on Monday will need nephrology c/s  -cont to monitor BMP  -cont home sevelamer, cinacelcet     #DM  -SSI, QID checks, hypoglycemia protocol     #HTN  -hold home amlodipine, hydralazine, losartan given hypotension and restart as able     #HFpEF  -cont home carvedilol     #chronic pain  -cont home gabapentin     #GERD  -cont home PPI     #mood disorder  -cont home bupropion     #gout  -cont home allopurinol      Guadalupe Huang MD    Supplementary Documentation:     Quality:  DVT Mechanical Prophylaxis:   SCDs,    DVT Pharmacologic Prophylaxis   Medication    heparin (Porcine) 5000 UNIT/ML injection 5,000 Units                Code Status: Full Code  Painter: No urinary catheter in place  Painter Duration (in days):   Central line:    REAGAN:     Discharge is dependent on: course  At this point Ms.  Reij Mcmullen is expected to be discharge to: unclear    The 21st Century Cures Act makes medical notes like these available to patients in the interest of transparency. Please be advised this is a medical document. Medical documents are intended to carry relevant information, facts as evident, and the clinical opinion of the practitioner. The medical note is intended as peer to peer communication and may appear blunt or direct. It is written in medical language and may contain abbreviations or verbiage that are unfamiliar.

## 2025-04-05 NOTE — ED INITIAL ASSESSMENT (HPI)
Snf called ems for ferver /nausea/vomiting x days  Abd pain   Hx dialysis MWF and bilat BKA x yrs  A/o x3    Pain now is right leg 5/10--WOUND VAC

## 2025-04-05 NOTE — H&P
[facility]     Rosario Mcmullen Patient Status:  Emergency    1955 MRN [id number]   Location [facility] EMERGENCY DEPARTMENT Attending Nicolás Villeda MD   Hosp Day # 0 PCP Sal Montalvo MD     Chief Complaint: fever, emesis    Subjective:    History of Present Illness:     Rosario Mcmullen is a 69 year old female with PMHx HTN/ DM/ HFpEF/ HLD/ ESRD (HD MWF)/ COPD who presented to the hospital for fever and emesis. History was limited as patient stated she is tired and confused from her infection.    She was last hospitalized at [facility] after a fall at home and was found to have a R BKA stump infection with cx crowing pseudomonas aeruginosa. She underwent debridement on 25 and a wound VAC was placed with discharge to SNF.    History/Other:    Past Medical History:  Past Medical History:    Arthritis    Bronchitis    COPD (chronic obstructive pulmonary disease) ([facility])    Esophageal reflux    ESRD (end stage renal disease) ([facility])    High blood pressure    Kidney disorder    Neuropathy of leg    Renal disorder    Sleep apnea    Type II or unspecified type diabetes mellitus without mention of complication, not stated as uncontrolled    Unspecified essential hypertension     Past Surgical History:   Past Surgical History:   Procedure Laterality Date    Av fistula revision, open      Knee arthroscopy Bilateral       Family History:   Family History   Problem Relation Age of Onset    Heart Attack Father     Cancer Mother      Social History:    reports that she quit smoking about 7 years ago. She started smoking about 52 years ago. She has never used smokeless tobacco. She reports current alcohol use. She reports that she does not use drugs.     Allergies: Allergies[1]    Medications:  Medications Ordered Prior to Encounter[2]    Review of Systems:   A comprehensive review of systems was completed.    Pertinent positives and negatives  noted in the HPI.    Objective:   Physical Exam:    /50   Pulse 81   Temp 98.7 °F (37.1 °C) (Temporal)   Resp 16   Ht 5' 5\" (1.651 m)   Wt 149 lb 14.6 oz (68 kg)   SpO2 100%   BMI 24.95 kg/m²   General: No acute distress, Alert  Respiratory: No rhonchi, no wheezes  Cardiovascular: S1, S2. Regular rate and rhythm  Abdomen: Soft, Non-tender, non-distended, positive bowel sounds  Neuro: No new focal deficits  Extremities: bilateral BKA, right BKA with wound vac and area of erythema and warmth    Results:    Labs:      Labs Last 24 Hours:    Recent Labs   Lab 04/04/25 2036   RBC 3.29*   HGB 10.6*   HCT 34.1*   .6*   MCH 32.2   MCHC 31.1   RDW 15.1   NEPRELIM 11.95*   WBC 13.0*   .0       Recent Labs   Lab 04/04/25  2148   *   BUN 29*   CREATSERUM 3.57*   EGFRCR 13*   CA 10.5   ALB 4.0      K 5.1   CL 99   CO2 29.0   ALKPHO 71   AST 15   ALT 13   BILT <0.2*   TP 6.9       Estimated Glomerular Filtration Rate: 13 mL/min/1.73m2 (A) (result from lab).    Lab Results   Component Value Date    INR 1.08 05/07/2018       No results for input(s): \"TROP\", \"TROPHS\", \"CK\" in the last 168 hours.    No results for input(s): \"TROP\", \"PBNP\" in the last 168 hours.    No results for input(s): \"PCT\" in the last 168 hours.    Imaging: Imaging data reviewed in Epic.    Assessment & Plan:      #Suspected Right stump Cellulitis  #leukocytosis  #SIRS positive  -COVID, Flu, RSV negative  -CT showing LLQ abdominal wall fat stranding and calcification (on exam no skin erythema here to support abdominal wall cellulitis)  -met 3 SIRS criteria: , RR 21, WBC 13 -->lactate 1.0, no signs additional organ involvement to suggest sepsis  -cont to trend CBC  -blood cx pending  -antibiotics:cefepime given hx pseudomonas  -MAP dropped to 62 on arrival to floor, Patient did not receive 30ml/kg of fluids despite having: hypotension. The reason for doing so is: patient has heart failure. 500mL of fluids were given  instead    #macrocytic anemia  -hgb 10.6: last 9.1  -no signs acute bleeding  -cont to monitor CBC    #ESRD  -HD MWF  -if still in hospital on Monday will need nephrology c/s  -cont to monitor BMP  -cont home sevelamer, cinacelcet    #DM  -SSI, QID checks, hypoglycemia protocol    #HTN  -hold home amlodipine, hydralazine, losartan given hypotension and restart as able    #HFpEF  -cont home carvedilol    #chronic pain  -cont home gabapentin    #GERD  -cont home PPI    #mood disorder  -cont home bupropion    #gout  -cont home allopurinol    Plan of care discussed with ED physician    Merlene Duncan DO    Supplementary Documentation:     The 21st Century Cures Act makes medical notes like these available to patients in the interest of transparency. Please be advised this is a medical document. Medical documents are intended to carry relevant information, facts as evident, and the clinical opinion of the practitioner. The medical note is intended as peer to peer communication and may appear blunt or direct. It is written in medical language and may contain abbreviations or verbiage that are unfamiliar.                                       [1]   Allergies  Allergen Reactions    Neurontin [Gabapentin]    [2]   No current facility-administered medications on file prior to encounter.     Current Outpatient Medications on File Prior to Encounter   Medication Sig Dispense Refill    cinacalcet 30 MG Oral Tab Take 30 mg by mouth daily.      Sevelamer HCl 800 MG Oral Tab Take 4 tablets by mouth in the morning, at noon, and at bedtime.      allopurinol 100 MG Oral Tab Take 100 mg by mouth daily.      PROCRIT 37625 UNIT/ML Injection Solution Take 1 mL by mouth every 14 (fourteen) days.      LEVEMIR FLEXTOUCH 100 UNIT/ML Subcutaneous Solution Pen-injector As Directed.      ergocalciferol 88162 units Oral Cap Take 1 capsule by mouth once a week.      glimepiride 2 MG Oral Tab Take by mouth daily with breakfast.    0    CloNIDine  HCl 0.3 MG Oral Tab Take 0.3 mg by mouth daily.        BuPROPion HCl ER, SR, 150 MG Oral Tablet 12 Hr Take 1 tablet by mouth 2 (two) times daily.      Losartan Potassium 50 MG Oral Tab Take 1 tablet by mouth daily.      aspirin (KP ASPIRIN) 81 MG Oral Tab EC Take 81 mg by mouth daily.      Cinnamon 500 MG Oral Cap Take 500 mg by mouth daily. 2 tablets every day      Lactobacillus (PROBIOTIC ACIDOPHILUS) Oral Cap Take 1 tablet by mouth 2 (two) times daily.      Oxybutynin Chloride (DITROPAN) 5 MG Oral Tab Take 5 mg by mouth 3 (three) times daily.      Furosemide (LASIX) 40 MG Oral Tab Take 80 mg by mouth 2 (two) times daily.        Montelukast Sodium (SINGULAIR) 10 MG Oral Tab Take 10 mg by mouth nightly.

## 2025-04-05 NOTE — ED INITIAL ASSESSMENT (HPI)
Snf called ems for fever/vomiting and abd pain  Pt has bilat below knee amp = rehab  Zofran odt given per ems prior to arrival  Temp at bs 98.7    pt is a/o x 3

## 2025-04-05 NOTE — ED PROVIDER NOTES
Patient Seen in: [facility] Emergency Department      History     Chief Complaint   Patient presents with    Fever     N/V abd pain   ems gave zofran odt prior to arival to a6     Stated Complaint: snf called for fever,n/v/pain    Subjective:   HPI      69-year-old female presenting to the emergency department for abdominal pain.  Last night she started having some nauseous this no actual vomiting she is having some looser bowel movements she says she is having pain across the lower abdominal region prompting her visit here reminiscent of previous blockages.  She denies any sort of cough or cold she is does not make urine she did go to dialysis today did not get as much off as she normally takes she does not feel short of breath and no chest pain no other exacerbating relieving factors or associated symptoms    Objective:     Past Medical History:    Arthritis    Bronchitis    COPD (chronic obstructive pulmonary disease) ([facility])    Esophageal reflux    ESRD (end stage renal disease) ([facility])    High blood pressure    Kidney disorder    Neuropathy of leg    Renal disorder    Sleep apnea    Type II or unspecified type diabetes mellitus without mention of complication, not stated as uncontrolled    Unspecified essential hypertension              Past Surgical History:   Procedure Laterality Date    Av fistula revision, open      Knee arthroscopy Bilateral                 Social History     Socioeconomic History    Marital status:    Tobacco Use    Smoking status: Former     Current packs/day: 0.00     Types: Cigarettes     Start date: 1972     Quit date: 2017     Years since quittin.8    Smokeless tobacco: Never   Substance and Sexual Activity    Alcohol use: Yes     Comment: rarely    Drug use: No     Social Drivers of Health     Food Insecurity: No Food Insecurity (2025)    Received from [facility]    Hunger Vital Sign     Worried About Running Out of Food in the Last Year: Never true      Ran Out of Food in the Last Year: Never true   Transportation Needs: No Transportation Needs (3/13/2025)    Received from [facility] Transportation Source     Has lack of transportation kept you from medical appointments or from getting medications?: No     Has lack of transportation kept you from meetings, work, or from getting things needed for daily living?: No   Housing Stability: Low Risk  (2/21/2025)    Received from [facility] Stability Vital Sign     Unable to Pay for Housing in the Last Year: No     Number of Times Moved in the Last Year: 0     Homeless in the Last Year: No                  Physical Exam     ED Triage Vitals [04/04/25 2040]   BP (!) 174/54   Pulse 98   Resp 15   Temp 98.7 °F (37.1 °C)   Temp src Temporal   SpO2 95 %   O2 Device None (Room air)       Current Vitals:   Vital Signs  BP: (!) 174/54  Pulse: 98  Resp: 19  Temp: 98.7 °F (37.1 °C)  Temp src: Temporal    Oxygen Therapy  SpO2: 94 %  O2 Device: None (Room air)        Physical Exam  Awake alert patient appears tired HEENT exam is normal lungs are clear cardiovascular exam regular rhythm abdomen soft slightly tender lower abdominal region no rebound or guarding extremities specifically right lower extremity shows a right BKA with wound VAC in place with surrounding erythema no focal neurologic deficits    ED Course     Labs Reviewed   LACTIC ACID, PLASMA   CBC WITH DIFFERENTIAL WITH PLATELET   COMP METABOLIC PANEL (14)   ETHYL ALCOHOL   LIPASE   RAINBOW DRAW LAVENDER   RAINBOW DRAW LIGHT GREEN   RAINBOW DRAW GOLD   RAINBOW DRAW BLUE   BLOOD CULTURE   BLOOD CULTURE     EKG    Rate, intervals and axes as noted on EKG Report.  Rate: 95  Rhythm: Sinus Rhythm  Reading: No areas of acute ST segment elevation or depression                Differential diagnosis includes obstruction, perforation       MDM              Medical Decision Making  69-year-old female presenting to the emergency department with abdominal  pain.  IV established cardiac monitor shows a sinus rhythm pulse ox shows no signs of hypoxia.  Independent interpretation by ED physician of CT scan shows no acute abnormality abdomen pelvis.  Metabolic panel shows a stable renal function and white cell count is elevated.  Patient will be started course of antibiotics given IV fluids and will be admitted at this time    Problems Addressed:  Abdominal pain of unknown etiology: acute illness or injury  Cellulitis of right lower extremity: acute illness or injury    Amount and/or Complexity of Data Reviewed  Labs: ordered. Decision-making details documented in ED Course.  Radiology: ordered and independent interpretation performed. Decision-making details documented in ED Course.  ECG/medicine tests: ordered and independent interpretation performed. Decision-making details documented in ED Course.        Disposition and Plan     Clinical Impression:  No diagnosis found.     Disposition:  There is no disposition on file for this visit.  There is no disposition time on file for this visit.    Follow-up:  No follow-up provider specified.        Medications Prescribed:  Current Discharge Medication List              Supplementary Documentation:

## 2025-04-06 LAB
ALBUMIN SERPL-MCNC: 3.5 G/DL (ref 3.2–4.8)
ALBUMIN/GLOB SERPL: 1.3 {RATIO} (ref 1–2)
ALP LIVER SERPL-CCNC: 62 U/L
ALT SERPL-CCNC: 16 U/L
ANION GAP SERPL CALC-SCNC: 8 MMOL/L (ref 0–18)
AST SERPL-CCNC: 14 U/L (ref ?–34)
BILIRUB SERPL-MCNC: <0.2 MG/DL (ref 0.2–1.1)
BUN BLD-MCNC: 43 MG/DL (ref 9–23)
CALCIUM BLD-MCNC: 10 MG/DL (ref 8.7–10.6)
CHLORIDE SERPL-SCNC: 99 MMOL/L (ref 98–112)
CO2 SERPL-SCNC: 28 MMOL/L (ref 21–32)
CREAT BLD-MCNC: 5.01 MG/DL
EGFRCR SERPLBLD CKD-EPI 2021: 9 ML/MIN/1.73M2 (ref 60–?)
ERYTHROCYTE [DISTWIDTH] IN BLOOD BY AUTOMATED COUNT: 15.2 %
GLOBULIN PLAS-MCNC: 2.6 G/DL (ref 2–3.5)
GLUCOSE BLD-MCNC: 101 MG/DL (ref 70–99)
GLUCOSE BLD-MCNC: 122 MG/DL (ref 70–99)
GLUCOSE BLD-MCNC: 130 MG/DL (ref 70–99)
GLUCOSE BLD-MCNC: 82 MG/DL (ref 70–99)
GLUCOSE BLD-MCNC: 95 MG/DL (ref 70–99)
HBV SURFACE AG SER-ACNC: 0.19 [IU]/L
HBV SURFACE AG SERPL QL IA: NONREACTIVE
HCT VFR BLD AUTO: 28.3 %
HGB BLD-MCNC: 8.6 G/DL
MCH RBC QN AUTO: 32.1 PG (ref 26–34)
MCHC RBC AUTO-ENTMCNC: 30.4 G/DL (ref 31–37)
MCV RBC AUTO: 105.6 FL
OSMOLALITY SERPL CALC.SUM OF ELEC: 291 MOSM/KG (ref 275–295)
PLATELET # BLD AUTO: 174 10(3)UL (ref 150–450)
POTASSIUM SERPL-SCNC: 5.4 MMOL/L (ref 3.5–5.1)
PROT SERPL-MCNC: 6.1 G/DL (ref 5.7–8.2)
RBC # BLD AUTO: 2.68 X10(6)UL
SODIUM SERPL-SCNC: 135 MMOL/L (ref 136–145)
WBC # BLD AUTO: 7.4 X10(3) UL (ref 4–11)

## 2025-04-06 PROCEDURE — 90935 HEMODIALYSIS ONE EVALUATION: CPT | Performed by: INTERNAL MEDICINE

## 2025-04-06 PROCEDURE — 5A1D70Z PERFORMANCE OF URINARY FILTRATION, INTERMITTENT, LESS THAN 6 HOURS PER DAY: ICD-10-PCS | Performed by: INTERNAL MEDICINE

## 2025-04-06 PROCEDURE — 99233 SBSQ HOSP IP/OBS HIGH 50: CPT | Performed by: INTERNAL MEDICINE

## 2025-04-06 NOTE — PLAN OF CARE
Patient A&O x4. Left arm precautions/AVF. RA, 2L at night. NSR on tele. IV abx. PRN pain meds per MAR. Tolerating soft diet, advance as tolerated. Incontinent x2. VALENCIA BKA. R dressing C/D/I. Port cath R upper chest. Total lift. Patient updated on poc, awaiting MRI. Received HD. No further needs at this time.     Problem: Patient/Family Goals  Goal: Patient/Family Long Term Goal  Description: Patient's Long Term Goal:   Discharge back to facility  Interventions:-   Follow POC  - See additional Care Plan goals for specific interventions  Outcome: Progressing  Goal: Patient/Family Short Term Goal  Description: Patient's Short Term Goal: 4/4 NOC:Rest  4/5 NOC:rest  Interventions: - cluster care  - See additional Care Plan goals for specific interventions  Outcome: Progressing

## 2025-04-06 NOTE — PROGRESS NOTES
[facility]  Progress Note    Rosario ELLA Mcmullen Patient Status:  Observation    1955 MRN [id number]   [facility] 5NW-A Attending Merlene Duncan, DO   Hosp Day # 0 PCP Sal Montalvo MD      HD today     Current Medications:    Current Facility-Administered Medications:     glucose (Dex4) 15 GM/59ML oral liquid 15 g, 15 g, Oral, Q15 Min PRN **OR** glucose (Glutose) 40% oral gel 15 g, 15 g, Oral, Q15 Min PRN **OR** glucose-vitamin C (Dex-4) chewable tab 4 tablet, 4 tablet, Oral, Q15 Min PRN **OR** dextrose 50% injection 50 mL, 50 mL, Intravenous, Q15 Min PRN **OR** glucose (Dex4) 15 GM/59ML oral liquid 30 g, 30 g, Oral, Q15 Min PRN **OR** glucose (Glutose) 40% oral gel 30 g, 30 g, Oral, Q15 Min PRN **OR** glucose-vitamin C (Dex-4) chewable tab 8 tablet, 8 tablet, Oral, Q15 Min PRN    heparin (Porcine) 5000 UNIT/ML injection 5,000 Units, 5,000 Units, Subcutaneous, 2 times per day    acetaminophen (Tylenol Extra Strength) tab 500 mg, 500 mg, Oral, Q4H PRN    melatonin tab 3 mg, 3 mg, Oral, Nightly PRN    ondansetron (Zofran) 4 MG/2ML injection 4 mg, 4 mg, Intravenous, Q6H PRN    metoclopramide (Reglan) 5 mg/mL injection 5 mg, 5 mg, Intravenous, Q8H PRN    polyethylene glycol (PEG 3350) (Miralax) 17 g oral packet 17 g, 17 g, Oral, Daily PRN    bisacodyl (Dulcolax) 10 MG rectal suppository 10 mg, 10 mg, Rectal, Daily PRN    insulin aspart (NovoLOG) 100 Units/mL FlexPen 2-10 Units, 2-10 Units, Subcutaneous, TID AC and HS    benzonatate (Tessalon) cap 200 mg, 200 mg, Oral, TID PRN    glycerin-hypromellose- (Artificial Tears) 0.2-0.2-1 % ophthalmic solution 1 drop, 1 drop, Both Eyes, QID PRN    sodium chloride (Saline Mist) 0.65 % nasal solution 1 spray, 1 spray, Each Nare, Q3H PRN    ceFEPIme (Maxpime) 2 g in sodium chloride 0.9% 100 mL IVPB-MBP, 2 g, Intravenous, Q24H    sennosides (Senokot) tab 8.6 mg, 8.6 mg, Oral, BID    sodium chloride tab 1 g, 1 g, Oral, TID CC     allopurinol (Zyloprim) tab 100 mg, 100 mg, Oral, Daily    buPROPion SR (WELLBUTRIN SR) 12 hr tab 300 mg, 300 mg, Oral, BID    carvedilol (Coreg) tab 25 mg, 25 mg, Oral, BID with meals    cinacalcet (Sensipar) tab 30 mg, 30 mg, Oral, Daily    diphenhydrAMINE (Benadryl) cap/tab 25 mg, 25 mg, Oral, Q6H PRN    gabapentin (Neurontin) cap 200 mg, 200 mg, Oral, Nightly    nystatin (Mycostatin) 100,000 Units/g cream, , Topical, BID    pantoprazole (Protonix) DR tab 40 mg, 40 mg, Oral, QAM AC    sevelamer carbonate (Renvela) tab 1,600 mg, 1,600 mg, Oral, TID CC    [START ON 4/7/2025] vancomycin (Vancocin) 750 mg in sodium chloride 0.9% 250 mL IVPB-ADDV, 750 mg, Intravenous, Once per day on Monday Wednesday Friday    sodium chloride 0.9 % IV bolus 100 mL, 100 mL, Intravenous, Q30 Min PRN **AND** albumin human (Albumin) 25% injection 25 g, 25 g, Intravenous, PRN Dialysis    lidocaine-prilocaine (Emla) 2.5-2.5 % cream, , Topical, PRN    ondansetron (Zofran) 4 MG/2ML injection 4 mg, 4 mg, Intravenous, Once  Home Medications:  No current outpatient medications on file.       Review of Systems:  See HPI; A total of 12 systems reviewed and otherwise unremarkable.    Physical Exam:  Vital signs: Blood pressure 112/54, pulse 65, temperature 97.8 °F (36.6 °C), temperature source Oral, resp. rate 18, height 5' 5\" (1.651 m), weight 227 lb (103 kg), SpO2 96%.  General: No acute distress. Alert and oriented x 3.  HEENT: Moist mucous membranes. EOM-I. PERRL  Neck: No lymphadenopathy.  No JVD. No carotid bruits.  Respiratory: Clear to auscultation bilaterally.  No wheezes. No rhonchi.  Cardiovascular: S1, S2.  Regular rate and rhythm.  No murmurs. Equal pulses   Abdomen: Soft, nontender, nondistended.  Positive bowel sounds. No rebound tenderness   Neurologic: No focal neurological deficits.   Musculoskeletal: R BKA;     Integument: No lesions. No erythema.  Psychiatric: Appropriate mood and affect.    Recent Labs     04/04/25 2036  04/05/25 0637 04/06/25  0636   WBC 13.0* 9.5 7.4   HGB 10.6* 9.6* 8.6*   .6* 104.1* 105.6*   .0 200.0 174.0       Recent Labs     04/04/25 2148 04/05/25 0637 04/06/25  0636    138 135*   K 5.1 5.2* 5.4*   CL 99 102 99   CO2 29.0 30.0 28.0   BUN 29* 35* 43*   CREATSERUM 3.57* 4.01* 5.01*   CA 10.5 10.7* 10.0       Recent Labs     04/04/25 2148 04/06/25  0636   ALT 13 16   AST 15 14   ALB 4.0 3.5         Imaging:  Reviewed     Impression:  ESRD - on HD ; MWF  Hyperkalemia- mild; monitor  - HD today off schedule; next can be on Wed   S/p BKA; suspect stump infection- recent tx @ OSH  - noted to have pseudomonas growth; CT noted- abscess cannot be excluded; plan for MRI  - would care  - abx  Anemia due to CKD/chronic disease  ANITA w/ HD  DM  HTN  PAD  Hx of CHF; stable      Thank you for allowing me to participate in this patient's care.  Please feel free to call me with any questions or concerns.    Rudolph Reyes MD  4/6/2025

## 2025-04-06 NOTE — PLAN OF CARE
A&OX4. Maintaining O2 on 2L at night. Tele, NSR. PRNs given for pain. W/C bound,PT OT. PIV ABx. Jakob BKA. full liquid diet. No further needs at this time. Continue POC. Safety precaution in place.   Problem: Patient/Family Goals  Goal: Patient/Family Long Term Goal  Description: Patient's Long Term Goal:   Discharge back to facility  Interventions:-   Follow POC  - See additional Care Plan goals for specific interventions  Outcome: Progressing  Goal: Patient/Family Short Term Goal  Description: Patient's Short Term Goal: 4/4 NOC:Rest  4/5 NOC:rest  Interventions: - cluster care  - See additional Care Plan goals for specific interventions  Outcome: Progressing

## 2025-04-06 NOTE — PROGRESS NOTES
[facility]   part of [facility]     Hospitalist Progress Note     Rosario JARAMILLO Rjei Mcmullen Patient Status:  Observation    1955 MRN [id number]   Location [facility] 5NW-A Attending Merlene Duncan,    Hosp Day # 0 PCP Sal Montalvo MD     Chief Complaint: fever, emesis    Subjective:     No acute complaints. Afebrile.     Objective:    Review of Systems:   A comprehensive review of systems was completed; pertinent positive and negatives stated in subjective.    Vital signs:  Temp:  [97.7 °F (36.5 °C)-98.8 °F (37.1 °C)] 98.1 °F (36.7 °C)  Pulse:  [56-67] 67  Resp:  [18-19] 18  BP: (112-152)/(24-67) 123/67  SpO2:  [96 %-100 %] 100 %    Physical Exam:    General: No acute distress  Respiratory: No wheezes, no rhonchi  Cardiovascular: S1, S2, regular rate and rhythm  Abdomen: Soft, Non-tender, non-distended, positive bowel sounds  Neuro: No new focal deficits.   Extremities: R BKA stump with dressing    Diagnostic Data:    Labs:  Recent Labs   Lab 25  0637 25  0636   WBC 13.0* 9.5 7.4   HGB 10.6* 9.6* 8.6*   .6* 104.1* 105.6*   .0 200.0 174.0       Recent Labs   Lab 25  2148 25  0637 25  0636   * 95 95   BUN 29* 35* 43*   CREATSERUM 3.57* 4.01* 5.01*   CA 10.5 10.7* 10.0   ALB 4.0  --  3.5    138 135*   K 5.1 5.2* 5.4*   CL 99 102 99   CO2 29.0 30.0 28.0   ALKPHO 71  --  62   AST 15  --  14   ALT 13  --  16   BILT <0.2*  --  <0.2*   TP 6.9  --  6.1       Estimated Glomerular Filtration Rate: 9 mL/min/1.73m2 (A) (result from lab).    No results for input(s): \"TROP\", \"TROPHS\", \"CK\" in the last 168 hours.    No results for input(s): \"PTP\", \"INR\" in the last 168 hours.               Microbiology    Hospital Encounter on 25   1. Blood Culture     Status: None (Preliminary result)    Collection Time: 25  7:50 AM    Specimen: Blood,peripheral   Result Value Ref Range    Blood Culture Result No Growth 1 Day N/A          Imaging: Reviewed in Epic.    Medications:    cefepime  1 g Intravenous Q24H    Vancomycin IV  1 each Intravenous See Admin Instructions (RX holding)    vancomycin  750 mg Intravenous Once    heparin  5,000 Units Subcutaneous 2 times per day    insulin aspart  2-10 Units Subcutaneous TID AC and HS    sennosides  8.6 mg Oral BID    sodium chloride  1 g Oral TID CC    allopurinol  100 mg Oral Daily    buPROPion SR  300 mg Oral BID    carvedilol  25 mg Oral BID with meals    cinacalcet  30 mg Oral Daily    gabapentin  200 mg Oral Nightly    nystatin   Topical BID    pantoprazole  40 mg Oral QAM AC    sevelamer carbonate  1,600 mg Oral TID CC    ondansetron  4 mg Intravenous Once       Assessment & Plan:      #Suspected Right stump Cellulitis c/f possible osteomyelitis   #Sepsis 2/2 above   Patient presented with abdominal pain. COVID, Flu, RSV negative  -CT showing LLQ abdominal wall fat stranding and calcification (on exam no skin erythema here to support abdominal wall cellulitis)  -CT RLE with considerable soft tissue edema, most prominent within the stump, no walled-off fluid collection. Possible subtle cortical irregularity of the distal tibial stump, possible osteomyelitis   -antibiotics:cefepime given hx pseudomonas  -ID following  -Follow cultures  -wound care consulted     #macrocytic anemia  -hgb 10.6: last 9.1  -no signs acute bleeding  -cont to monitor CBC     #ESRD  -HD MWF  -if still in hospital on Monday will need nephrology c/s  -cont to monitor BMP  -cont home sevelamer, cinacelcet     #DM  -SSI, QID checks, hypoglycemia protocol     #HTN  -hold home amlodipine, hydralazine, losartan given hypotension and restart as able     #HFpEF  -cont home carvedilol     #chronic pain  -cont home gabapentin     #GERD  -cont home PPI     #mood disorder  -cont home bupropion     #gout  -cont home allopurinol      Guadalupe Huang MD    Supplementary Documentation:     Quality:  DVT Mechanical Prophylaxis:    SCDs,    DVT Pharmacologic Prophylaxis   Medication    heparin (Porcine) 5000 UNIT/ML injection 5,000 Units                Code Status: Full Code  Painter: No urinary catheter in place  Painter Duration (in days):   Central line:    REAGAN:     Discharge is dependent on: course  At this point Ms. Reji Mcmullen is expected to be discharge to: unclear    The 21st Century Cures Act makes medical notes like these available to patients in the interest of transparency. Please be advised this is a medical document. Medical documents are intended to carry relevant information, facts as evident, and the clinical opinion of the practitioner. The medical note is intended as peer to peer communication and may appear blunt or direct. It is written in medical language and may contain abbreviations or verbiage that are unfamiliar.

## 2025-04-06 NOTE — CONSULTS
[facility]   part of [facility] ID CONSULT NOTE    Rosario Mcmullen Patient Status:  Inpatient    1955 MRN [id number]   Location [facility] 5NW-A Attending Merlene Duncan, DO   Hosp Day # 0 PCP Sal Montalvo MD       Reason for Consultation:  R BKA stump infection    Antibiotics: IV vanc, cefepime    ASSESSMENT:    # Acute on chronic R BKA stump wound  - s/p several I&Ds in the past at OSH in [place]. Most recent I&D on 25 per chart review. Has wound vac in place.  - CT with soft tissue edema. No abscess.   - MRI RLE pending  - wound with pink healthy tissue at the base.     # B/L BKA due to severe PVD  # ESRD on HD  # DMII, HTN, HLD    PLAN:    -  continue IV vanc and cefepime for now  -  local wound care per wound care team  -  will follow blood cx results  -  follow MRI RLE results  -  Follow fever curve, wbc  -  Reviewed labs, micro, imaging reports, available old records    D/w pt, RN    Thank you for allowing us to participate in the care of this patient. Please do not hesitate to call if you have any questions.   We will continue to follow with you and will make further recommendations based on her progress.    History of Present Illness:  Rosario Mcmullen is a a(n) 69 year old female with past medical history of diabetes type 2, hypertension, hyperlipidemia, ESRD on dialysis, COPD and bilateral BKA, right BKA was done on 12/3/2024 followed by acute rehab.  Since her surgery, she has had multiple hospital admissions and rehab stays for chronic right BKA stump infection.  Her initial surgery was performed in [place], [place] she had COVID-pneumonia and respiratory failure in 2025 further complicated by right BKA stump infection and underwent surgical debridement on 2025.  She was discharged from acute rehab on 3/14/2025. She has been residing at a [facility] in [place] as it is closer to her family.  She came to ED on 2025 with  complaints of fever, vomiting and abdominal pain.  Has right BKA with wound VAC in place.  Had low-grade temp of 100.5 on admission with normal WBC.  Started on IV Vanco and cefepime.  Blood cultures in progress.     History:  Past Medical History:    Arthritis    Bronchitis    COPD (chronic obstructive pulmonary disease) ([facility])    Esophageal reflux    ESRD (end stage renal disease) (Formerly [facility])    High blood pressure    Kidney disorder    Neuropathy of leg    Renal disorder    Sleep apnea    Type II or unspecified type diabetes mellitus without mention of complication, not stated as uncontrolled    Unspecified essential hypertension     Past Surgical History:   Procedure Laterality Date    Av fistula revision, open      Knee arthroscopy Bilateral      Family History   Problem Relation Age of Onset    Heart Attack Father     Cancer Mother       reports that she quit smoking about 7 years ago. She started smoking about 52 years ago. She has never used smokeless tobacco. She reports current alcohol use. She reports that she does not use drugs.    Allergies:  Allergies[1]    Medications:    Current Facility-Administered Medications:     ceFEPIme (Maxipime) 1 g in sodium chloride 0.9% 100 mL IVPB-MBP, 1 g, Intravenous, Q24H    glucose (Dex4) 15 GM/59ML oral liquid 15 g, 15 g, Oral, Q15 Min PRN **OR** glucose (Glutose) 40% oral gel 15 g, 15 g, Oral, Q15 Min PRN **OR** glucose-vitamin C (Dex-4) chewable tab 4 tablet, 4 tablet, Oral, Q15 Min PRN **OR** dextrose 50% injection 50 mL, 50 mL, Intravenous, Q15 Min PRN **OR** glucose (Dex4) 15 GM/59ML oral liquid 30 g, 30 g, Oral, Q15 Min PRN **OR** glucose (Glutose) 40% oral gel 30 g, 30 g, Oral, Q15 Min PRN **OR** glucose-vitamin C (Dex-4) chewable tab 8 tablet, 8 tablet, Oral, Q15 Min PRN    heparin (Porcine) 5000 UNIT/ML injection 5,000 Units, 5,000 Units, Subcutaneous, 2 times per day    acetaminophen (Tylenol Extra Strength) tab 500 mg, 500 mg, Oral, Q4H PRN    melatonin tab 3 mg,  3 mg, Oral, Nightly PRN    ondansetron (Zofran) 4 MG/2ML injection 4 mg, 4 mg, Intravenous, Q6H PRN    metoclopramide (Reglan) 5 mg/mL injection 5 mg, 5 mg, Intravenous, Q8H PRN    polyethylene glycol (PEG 3350) (Miralax) 17 g oral packet 17 g, 17 g, Oral, Daily PRN    bisacodyl (Dulcolax) 10 MG rectal suppository 10 mg, 10 mg, Rectal, Daily PRN    insulin aspart (NovoLOG) 100 Units/mL FlexPen 2-10 Units, 2-10 Units, Subcutaneous, TID AC and HS    benzonatate (Tessalon) cap 200 mg, 200 mg, Oral, TID PRN    glycerin-hypromellose- (Artificial Tears) 0.2-0.2-1 % ophthalmic solution 1 drop, 1 drop, Both Eyes, QID PRN    sodium chloride (Saline Mist) 0.65 % nasal solution 1 spray, 1 spray, Each Nare, Q3H PRN    sennosides (Senokot) tab 8.6 mg, 8.6 mg, Oral, BID    sodium chloride tab 1 g, 1 g, Oral, TID CC    allopurinol (Zyloprim) tab 100 mg, 100 mg, Oral, Daily    buPROPion SR (WELLBUTRIN SR) 12 hr tab 300 mg, 300 mg, Oral, BID    carvedilol (Coreg) tab 25 mg, 25 mg, Oral, BID with meals    cinacalcet (Sensipar) tab 30 mg, 30 mg, Oral, Daily    diphenhydrAMINE (Benadryl) cap/tab 25 mg, 25 mg, Oral, Q6H PRN    gabapentin (Neurontin) cap 200 mg, 200 mg, Oral, Nightly    nystatin (Mycostatin) 100,000 Units/g cream, , Topical, BID    pantoprazole (Protonix) DR tab 40 mg, 40 mg, Oral, QAM AC    sevelamer carbonate (Renvela) tab 1,600 mg, 1,600 mg, Oral, TID CC    sodium chloride 0.9 % IV bolus 100 mL, 100 mL, Intravenous, Q30 Min PRN **AND** albumin human (Albumin) 25% injection 25 g, 25 g, Intravenous, PRN Dialysis    lidocaine-prilocaine (Emla) 2.5-2.5 % cream, , Topical, PRN    ondansetron (Zofran) 4 MG/2ML injection 4 mg, 4 mg, Intravenous, Once    Review of Systems:  CONSTITUTIONAL:  No weight loss, weakness or fatigue.  HEENT:  Eyes:  No visual loss, blurred vision, double vision or yellow sclerae. Ears, Nose, Throat:  No hearing loss, sneezing, congestion, runny nose or sore throat.  SKIN:  R leg stump open  wound  CARDIOVASCULAR:  No chest pain, chest pressure or chest discomfort  RESPIRATORY:  No shortness of breath, cough or sputum.  GASTROINTESTINAL:  No anorexia, nausea, vomiting or diarrhea. No abdominal pain or blood.  GENITOURINARY:  No Burning on urination.   NEUROLOGICAL:  No headache, dizziness, syncope, paralysis, ataxia, numbness or tingling in the extremities.  MUSCULOSKELETAL:  R leg stump pain    Physical Exam:  Vital signs: Blood pressure 112/54, pulse 65, temperature 97.8 °F (36.6 °C), temperature source Oral, resp. rate 18, height 5' 5\" (1.651 m), weight 227 lb (103 kg), SpO2 96%.    General: Alert, oriented, NAD  HEENT: Moist mucous membranes. EOMI  Neck: No lymphadenopathy.  Supple.  Cardiovascular: RRR  Respiratory: Clear to auscultation bilaterally.  No wheezes. No rhonchi.  Abdomen: Soft, nontender, nondistended.   Musculoskeletal: L BKA stump healed. R BKA stump with open ulcer. Erythema with pink healthy base. No necrosis/eschar   Integument: as above    Laboratory Data:  Recent Labs   Lab 04/05/25  0637 04/06/25  0636   RBC 2.96* 2.68*   HGB 9.6* 8.6*   HCT 30.8* 28.3*   .1* 105.6*   MCH 32.4 32.1   MCHC 31.2 30.4*   RDW 15.0 15.2   NEPRELIM 8.19*  --    WBC 9.5 7.4   .0 174.0     Recent Labs   Lab 04/04/25  2148 04/05/25  0637 04/06/25  0636   * 95 95   BUN 29* 35* 43*   CREATSERUM 3.57* 4.01* 5.01*   CA 10.5 10.7* 10.0   ALB 4.0  --  3.5    138 135*   K 5.1 5.2* 5.4*   CL 99 102 99   CO2 29.0 30.0 28.0   ALKPHO 71  --  62   AST 15  --  14   ALT 13  --  16   BILT <0.2*  --  <0.2*   TP 6.9  --  6.1       Microbiology: Reviewed in EMR    Radiology: Reviewed      MD Nelly Macdonald [facility]  [phone] 4/6/2025         [1]   Allergies  Allergen Reactions    Neurontin [Gabapentin]

## 2025-04-07 ENCOUNTER — APPOINTMENT (OUTPATIENT)
Dept: MRI IMAGING | Facility: HOSPITAL | Age: 70
End: 2025-04-07
Attending: INTERNAL MEDICINE
Payer: MEDICARE

## 2025-04-07 PROBLEM — Z99.2 ESRD ON HEMODIALYSIS (HCC): Status: ACTIVE | Noted: 2025-04-05

## 2025-04-07 PROBLEM — E87.5 HYPERKALEMIA: Status: ACTIVE | Noted: 2025-04-07

## 2025-04-07 PROBLEM — N18.6 ANEMIA IN ESRD (END-STAGE RENAL DISEASE) (HCC): Status: ACTIVE | Noted: 2018-05-07

## 2025-04-07 PROBLEM — D63.1 ANEMIA IN ESRD (END-STAGE RENAL DISEASE) (HCC): Status: ACTIVE | Noted: 2018-05-07

## 2025-04-07 PROBLEM — N18.6 ESRD ON HEMODIALYSIS (HCC): Status: ACTIVE | Noted: 2025-04-05

## 2025-04-07 LAB
ANION GAP SERPL CALC-SCNC: 9 MMOL/L (ref 0–18)
BUN BLD-MCNC: 36 MG/DL (ref 9–23)
CALCIUM BLD-MCNC: 9.8 MG/DL (ref 8.7–10.6)
CHLORIDE SERPL-SCNC: 98 MMOL/L (ref 98–112)
CO2 SERPL-SCNC: 29 MMOL/L (ref 21–32)
CREAT BLD-MCNC: 3.9 MG/DL
EGFRCR SERPLBLD CKD-EPI 2021: 12 ML/MIN/1.73M2 (ref 60–?)
GLUCOSE BLD-MCNC: 107 MG/DL (ref 70–99)
GLUCOSE BLD-MCNC: 136 MG/DL (ref 70–99)
GLUCOSE BLD-MCNC: 140 MG/DL (ref 70–99)
GLUCOSE BLD-MCNC: 143 MG/DL (ref 70–99)
GLUCOSE BLD-MCNC: 98 MG/DL (ref 70–99)
MAGNESIUM SERPL-MCNC: 1.9 MG/DL (ref 1.6–2.6)
OSMOLALITY SERPL CALC.SUM OF ELEC: 290 MOSM/KG (ref 275–295)
POTASSIUM SERPL-SCNC: 4.3 MMOL/L (ref 3.5–5.1)
SODIUM SERPL-SCNC: 136 MMOL/L (ref 136–145)

## 2025-04-07 PROCEDURE — 73718 MRI LOWER EXTREMITY W/O DYE: CPT | Performed by: INTERNAL MEDICINE

## 2025-04-07 PROCEDURE — 99233 SBSQ HOSP IP/OBS HIGH 50: CPT | Performed by: INTERNAL MEDICINE

## 2025-04-07 RX ORDER — ASCORBIC ACID, THIAMINE, RIBOFLAVIN, NIACINAMIDE, PYRIDOXINE, FOLIC ACID, COBALAMIN, BIOTIN, PANTOTHENIC ACID 100; 1.5; 1.7; 20; 10; 1; 6; 300; 1 MG/1; MG/1; MG/1; MG/1; MG/1; MG/1; UG/1; UG/1; MG/1
1 TABLET, COATED ORAL DAILY
Status: DISCONTINUED | OUTPATIENT
Start: 2025-04-08 | End: 2025-04-11

## 2025-04-07 NOTE — DIETARY NOTE
[facility]   part of [facility]   CLINICAL NUTRITION    Rosario Mcmullen admitted on 4/4 presents with R BKA stump cellulitis.    PMH: Arthritis, Bronchitis, COPD, Esophageal reflux, ESRD, Sleep apnea, Type II DM, essential hypertension.     Admitting diagnosis:  Abdominal pain of unknown etiology [R10.9]  Cellulitis of right lower extremity [L03.115]    Ht: 5'8\"  Wt: 104.3 kg (230 lb).   Adjusted BMI (-11.8% for b/l BKA): 39.3 kg/m².    Wt Readings from Last 6 Encounters:   04/07/25 104.3 kg (230 lb)   08/20/21 132 kg (291 lb 0.1 oz)   08/02/19 134 kg (295 lb 6.7 oz)   05/07/18 136.1 kg (300 lb)   02/14/18 136.1 kg (300 lb)   01/25/18 (!) 151 kg (333 lb)   Per Care Everywhere:  12/30/24 106.4 kg (234 lb)  1/17/25 108 kg  (238 lb)  2/23/25 106.3 kg (234 lb)  2/28/25 99.4 kg (219 lb)  3/4/25  104.3 kg (229 lb)    Labs/Meds reviewed    Diet:       Procedures    Regular/General diet Is Patient on Accuchecks? Yes; Misc Restriction: Renal       Percent Meals Eaten (last 3 days)       Date/Time Percent Meals Eaten (%)    04/05/25 1100 90 %    04/05/25 1722 100 %    04/07/25 0900 90 %            Pt chart reviewed d/t MST score 4. Visited pt at bedside. Pt reports decreased appetite d/t not liking her food choices. Noted she is ordered low fiber renal diet - discussed liberalizing to just renal restriction. Reports having chronic nausea >1 year and takes PPI which sometimes helps. Denies diarrhea and constipation but last BM 4/4 - may need bowel regimen. No chewing or swallowing difficulties and NKFA. She reports her most recent dry wt after HD was ~218 lbs so she does not believe current wt of 230 lbs in EMR is correct. Reports she does not tolerate any protein bars or drinks d/t N/V but does take Drake and agreeable to take during admit. Continued to encourage protein and provided examples. All questions answered at this time.    Patient is at low nutrition risk at this time.    Please consult if patient  status changes or nutrition issues arise.    Cely Catalan RD, LDN, [facility]  Clinical Dietitian  Spectra: [phone]

## 2025-04-07 NOTE — PLAN OF CARE
Received pt A&Ox4.  on RA, 2L/NOC. NSR on tele. 12 beats lena Crisostomo MD paged, no new orders. Heparin for VTE prophylaxis. Tolerating diet - advanced to regular. Accuchecks QID. Anuric. HD patient - AVF. Pain management per MAR, declining anything other than tylenol. Plan of care continues, no further needs at this time.     Problem: Patient/Family Goals  Goal: Patient/Family Long Term Goal  Description: Patient's Long Term Goal:   Discharge back to facility  Interventions:-   Follow POC  - See additional Care Plan goals for specific interventions  Outcome: Progressing  Goal: Patient/Family Short Term Goal  Description: Patient's Short Term Goal: 4/4 NOC:Rest  4/5 NOC:rest  Interventions: - cluster care  - See additional Care Plan goals for specific interventions  Outcome: Progressing

## 2025-04-07 NOTE — PROGRESS NOTES
[facility]   part of [facility]     Hospitalist Progress Note     Rosario Hustonbonnie Gandhililiya Patient Status:  Observation    1955 MRN [id number]   Location [facility] 5NW-A Attending Merlene Duncan,    Hosp Day # 1 PCP Sal Montalvo MD     Chief Complaint: fever, emesis    Subjective:     MR today AM. NO acute complaints.     Objective:    Review of Systems:   A comprehensive review of systems was completed; pertinent positive and negatives stated in subjective.    Vital signs:  Temp:  [98 °F (36.7 °C)-98.6 °F (37 °C)] 98 °F (36.7 °C)  Pulse:  [63-73] 67  Resp:  [16-18] 17  BP: (112-189)/(37-58) 151/37  SpO2:  [93 %-99 %] 94 %    Physical Exam:    General: No acute distress  Respiratory: No wheezes, no rhonchi  Cardiovascular: S1, S2, regular rate and rhythm  Abdomen: Soft, Non-tender, non-distended, positive bowel sounds  Neuro: No new focal deficits.   Extremities: R BKA stump with dressing    Diagnostic Data:    Labs:  Recent Labs   Lab 25  0637 25  0636   WBC 13.0* 9.5 7.4   HGB 10.6* 9.6* 8.6*   .6* 104.1* 105.6*   .0 200.0 174.0       Recent Labs   Lab 25  2148 25  0637 25  0636 25  0648   * 95 95 98   BUN 29* 35* 43* 36*   CREATSERUM 3.57* 4.01* 5.01* 3.90*   CA 10.5 10.7* 10.0 9.8   ALB 4.0  --  3.5  --     138 135* 136   K 5.1 5.2* 5.4* 4.3   CL 99 102 99 98   CO2 29.0 30.0 28.0 29.0   ALKPHO 71  --  62  --    AST 15  --  14  --    ALT 13  --  16  --    BILT <0.2*  --  <0.2*  --    TP 6.9  --  6.1  --        Estimated Glomerular Filtration Rate: 12 mL/min/1.73m2 (A) (result from lab).    No results for input(s): \"TROP\", \"TROPHS\", \"CK\" in the last 168 hours.    No results for input(s): \"PTP\", \"INR\" in the last 168 hours.               Microbiology    Hospital Encounter on 25   1. Blood Culture     Status: None (Preliminary result)    Collection Time: 25  7:50 AM    Specimen: Blood,peripheral    Result Value Ref Range    Blood Culture Result No Growth 2 Days N/A         Imaging: Reviewed in Epic.    Medications:    [START ON 4/8/2025] multivitamin  1 tablet Oral Daily    cefepime  1 g Intravenous Q24H    Vancomycin IV  1 each Intravenous See Admin Instructions (RX holding)    heparin  5,000 Units Subcutaneous 2 times per day    insulin aspart  2-10 Units Subcutaneous TID AC and HS    sennosides  8.6 mg Oral BID    sodium chloride  1 g Oral TID CC    allopurinol  100 mg Oral Daily    buPROPion SR  300 mg Oral BID    carvedilol  25 mg Oral BID with meals    cinacalcet  30 mg Oral Daily    gabapentin  200 mg Oral Nightly    nystatin   Topical BID    pantoprazole  40 mg Oral QAM AC    sevelamer carbonate  1,600 mg Oral TID CC    ondansetron  4 mg Intravenous Once       Assessment & Plan:      #Suspected Right stump Cellulitis c/f possible osteomyelitis   #Sepsis 2/2 above   Patient presented with abdominal pain. COVID, Flu, RSV negative  -CT showing LLQ abdominal wall fat stranding and calcification (on exam no skin erythema here to support abdominal wall cellulitis)  -CT RLE with considerable soft tissue edema, most prominent within the stump, no walled-off fluid collection. Possible subtle cortical irregularity of the distal tibial stump, possible osteomyelitis   -MR with focal marrow edema distal amputated margin of tibia early osteomyelitis, circumferential superficial edema around leg and edema within muscles and intermuscular fascial planes could represent cellulitis/myositis/fasciitis  -antibiotics:cefepime given hx pseudomonas  -ID following  -Follow cultures  -wound care consulted    #Peripheral vascular disease  CTA in 01/2025 with aortoiliac inflow to both lower extremities patient. Right lower with severe stenosis at origin of the profunda femoris and diffuse disease of the femoral-popliteal arteries resulting in moderate to severe diffuse stenosis. Based on prior vascular surgery note at OSH in  01/2025, revascularization, RAKA were offered and patient decided to continue wound care and follow up with Dr. Maguire (Vascular surgeon in [place]). Will reach out to Dr. Maguire today to discuss.     Vascular surgery recommendation at :   \"In this setting, the patient has three options that were discussed with her. The first and most aggressive course of action would be open revascularization of R profunda to improve blood flow to R stump. While this would improve her chances of healing a R AKA, it would put her at significant risk of wound complications in her groin, which are all but certain given her extensive history of groin wound complications during left femoral endarterectomy. The second option would be to forgo revascularization attempts and proceed with a R AKA only. While takes the risk of groin wound infection out of the picture, her current blood flow would only leave her with a marginal chance of healing the R AKA. Furthermore, if she chooses either of these options and has an AKA, while there is a small chance of her being able to use her L stump to pivot on, this is unlikely given the tenuous nature of still-healing L BKA stump wound, and so the patient would almost certainly be subjected to living in a facility to get the care she needs. The third option, since the wounds are not making her ill, is to continue treating her chronic R BKA wound with dressing changes until she is able to see her [place] vascular surgeon again in follow up. We discussed these options at length with the patient, and she reiterated to me several times that what she most values is spending time at home in [place] and not being in a nursing home. She also has a long-standing relationship with her [place] vascular surgeon, who she would like to continue to see. Based on this discussion, she decided at this point to not pursue more aggressive vascular treatments while here in the hospital; however, she may yet decide to pursue  further treatment with her home vascular surgeon. If the patient decides she wants to pursue either of these other options detailed as above, please do not hesitate to reach back out to us.\"     #macrocytic anemia  -hgb 10.6: last 9.1  -no signs acute bleeding  -cont to monitor CBC     #ESRD  -HD MWF  -if still in hospital on Monday will need nephrology c/s  -cont to monitor BMP  -cont home sevelamer, cinacelcet     #DM  -SSI, QID checks, hypoglycemia protocol     #HTN  -hold home amlodipine, hydralazine, losartan given hypotension and restart as able     #HFpEF  -cont home carvedilol     #chronic pain  -cont home gabapentin     #GERD  -cont home PPI     #mood disorder  -cont home bupropion     #gout  -cont home allopurinol    Guadalupe Huang MD    Supplementary Documentation:     Quality:  DVT Mechanical Prophylaxis:   SCDs,    DVT Pharmacologic Prophylaxis   Medication    heparin (Porcine) 5000 UNIT/ML injection 5,000 Units                Code Status: Full Code  Painter: No urinary catheter in place  Painter Duration (in days):   Central line:    REAGAN:     Discharge is dependent on: course  At this point Ms. Reji Mcmullen is expected to be discharge to: unclear    The 21st Century Cures Act makes medical notes like these available to patients in the interest of transparency. Please be advised this is a medical document. Medical documents are intended to carry relevant information, facts as evident, and the clinical opinion of the practitioner. The medical note is intended as peer to peer communication and may appear blunt or direct. It is written in medical language and may contain abbreviations or verbiage that are unfamiliar.

## 2025-04-07 NOTE — PROGRESS NOTES
[facility]   part of [facility]     Nephrology Progress Note    Rosario ELLA Mcmullen Patient Status:  Inpatient    1955 MRN [id number]   Location [facility] 5NW-A Attending Merlene Duncan, DO   Hosp Day # 1 PCP Sal Montalvo MD       SUBJECTIVE:  Denies complaints this morning        Physical Exam:   BP (!) 189/58 (BP Location: Right arm)   Pulse 63   Temp 98 °F (36.7 °C) (Oral)   Resp 16   Ht 5' 5\" (1.651 m)   Wt 230 lb (104.3 kg)   SpO2 97%   BMI 38.27 kg/m²   Temp (24hrs), Av.3 °F (36.8 °C), Min:97.8 °F (36.6 °C), Max:98.6 °F (37 °C)       Intake/Output Summary (Last 24 hours) at 2025 0839  Last data filed at 2025 1550  Gross per 24 hour   Intake --   Output 2000 ml   Net -2000 ml     Last 3 Weights   25 0633 230 lb (104.3 kg)   25 0121 227 lb (103 kg)   25 0109 227 lb 11.8 oz (103.3 kg)   25 204 149 lb 14.6 oz (68 kg)   21 1515 291 lb 0.1 oz (132 kg)   19 1118 295 lb 6.7 oz (134 kg)   18 1259 300 lb (136.1 kg)   18 1502 300 lb (136.1 kg)     General: Alert and oriented in no apparent distress.  HEENT: No scleral icterus, MMM  Neck: Supple, no BERTHA or thyromegaly  Cardiac: Regular rate and rhythm, S1, S2 normal, no murmur or rub  Lungs: Clear without wheezes, rales, rhonchi.    Abdomen: Soft, non-tender. + bowel sounds, no palpable organomegaly  Extremities: Without clubbing, cyanosis or edema. R BKA noted, left AV fistula with bruit and thrill  Neurologic: Alert and oriented, cranial nerves grossly intact, moving all extremities  Skin: Warm and dry, no rash        Labs:     Recent Labs   Lab 25  0637 25  0636   WBC 13.0* 9.5 7.4   HGB 10.6* 9.6* 8.6*   .6* 104.1* 105.6*   .0 200.0 174.0       Recent Labs   Lab 2537 25  0636 25  0648    138 135* 136   K 5.1 5.2* 5.4* 4.3   CL 99 102 99 98   CO2 29.0 30.0 28.0 29.0   BUN 29* 35* 43*  36*   CREATSERUM 3.57* 4.01* 5.01* 3.90*   CA 10.5 10.7* 10.0 9.8   MG  --   --   --  1.9   * 95 95 98       Recent Labs   Lab 04/04/25  2148 04/06/25  0636   ALT 13 16   AST 15 14   ALB 4.0 3.5       Recent Labs   Lab 04/06/25  0451 04/06/25  1222 04/06/25  1736 04/06/25  2130 04/07/25  0521   PGLU 82 101* 130* 122* 107*       Meds:   Current Facility-Administered Medications   Medication Dose Route Frequency    ceFEPIme (Maxipime) 1 g in sodium chloride 0.9% 100 mL IVPB-MBP  1 g Intravenous Q24H    Vancomycin: PHARMACY DOSING  1 each Intravenous See Admin Instructions (RX holding)    glucose (Dex4) 15 GM/59ML oral liquid 15 g  15 g Oral Q15 Min PRN    Or    glucose (Glutose) 40% oral gel 15 g  15 g Oral Q15 Min PRN    Or    glucose-vitamin C (Dex-4) chewable tab 4 tablet  4 tablet Oral Q15 Min PRN    Or    dextrose 50% injection 50 mL  50 mL Intravenous Q15 Min PRN    Or    glucose (Dex4) 15 GM/59ML oral liquid 30 g  30 g Oral Q15 Min PRN    Or    glucose (Glutose) 40% oral gel 30 g  30 g Oral Q15 Min PRN    Or    glucose-vitamin C (Dex-4) chewable tab 8 tablet  8 tablet Oral Q15 Min PRN    heparin (Porcine) 5000 UNIT/ML injection 5,000 Units  5,000 Units Subcutaneous 2 times per day    acetaminophen (Tylenol Extra Strength) tab 500 mg  500 mg Oral Q4H PRN    melatonin tab 3 mg  3 mg Oral Nightly PRN    ondansetron (Zofran) 4 MG/2ML injection 4 mg  4 mg Intravenous Q6H PRN    metoclopramide (Reglan) 5 mg/mL injection 5 mg  5 mg Intravenous Q8H PRN    polyethylene glycol (PEG 3350) (Miralax) 17 g oral packet 17 g  17 g Oral Daily PRN    bisacodyl (Dulcolax) 10 MG rectal suppository 10 mg  10 mg Rectal Daily PRN    insulin aspart (NovoLOG) 100 Units/mL FlexPen 2-10 Units  2-10 Units Subcutaneous TID AC and HS    benzonatate (Tessalon) cap 200 mg  200 mg Oral TID PRN    glycerin-hypromellose- (Artificial Tears) 0.2-0.2-1 % ophthalmic solution 1 drop  1 drop Both Eyes QID PRN    sodium chloride (Saline  Mist) 0.65 % nasal solution 1 spray  1 spray Each Nare Q3H PRN    sennosides (Senokot) tab 8.6 mg  8.6 mg Oral BID    sodium chloride tab 1 g  1 g Oral TID CC    allopurinol (Zyloprim) tab 100 mg  100 mg Oral Daily    buPROPion SR (WELLBUTRIN SR) 12 hr tab 300 mg  300 mg Oral BID    carvedilol (Coreg) tab 25 mg  25 mg Oral BID with meals    cinacalcet (Sensipar) tab 30 mg  30 mg Oral Daily    diphenhydrAMINE (Benadryl) cap/tab 25 mg  25 mg Oral Q6H PRN    gabapentin (Neurontin) cap 200 mg  200 mg Oral Nightly    nystatin (Mycostatin) 100,000 Units/g cream   Topical BID    pantoprazole (Protonix) DR tab 40 mg  40 mg Oral QAM AC    sevelamer carbonate (Renvela) tab 1,600 mg  1,600 mg Oral TID CC    sodium chloride 0.9 % IV bolus 100 mL  100 mL Intravenous Q30 Min PRN    And    albumin human (Albumin) 25% injection 25 g  25 g Intravenous PRN Dialysis    lidocaine-prilocaine (Emla) 2.5-2.5 % cream   Topical PRN    ondansetron (Zofran) 4 MG/2ML injection 4 mg  4 mg Intravenous Once         Impression/Plan:      ESRD - on HD ; MWF, was dialyzed yesterday off schedule and plan next dialysis on Wednesday  S/p BKA; suspect stump infection- recent tx @ OSH  - noted to have pseudomonas growth; CT noted- abscess cannot be excluded; plan for MRI  - would care  - abx  Anemia due to CKD/chronic disease  ANITA w/ HD  DM  HTN  PAD  Hx of CHF; stable         Questions/concerns were discussed with patient and/or family by bedside.          Neeraj Nj MD  4/7/2025  8:39 AM

## 2025-04-07 NOTE — CM/SW NOTE
Department  notified of request for magdy PALACIOS referrals started. Assigned CM/SW to follow up with pt/family on further discharge planning.     Lolly Ramirez, DSC

## 2025-04-07 NOTE — CONSULTS
[facility]  Report of Inpatient Wound Care Consultation    Rosario Mcmullen Patient Status:  Inpatient    1955 MRN [id number]   [facility] 5NW-A Attending Merlene Duncan, DO   Hosp Day # 1 PCP Sal Montalvo MD     Reason for Consultation:  Right BKA site    History of Present Illness:  Rosario Mcmullen is a a(n) 69 year old female. Arrived to patient's room where patient is resting in bed. Patient agreeable to allow wound care staff to assess, photograph, and treat wound. Patient with multiple comorbidities, with skin breakdown described below.       History:  Past Medical History:    Arthritis    Bronchitis    COPD (chronic obstructive pulmonary disease) ([facility])    Esophageal reflux    ESRD (end stage renal disease) ([facility])    High blood pressure    Kidney disorder    Neuropathy of leg    Renal disorder    Sleep apnea    Type II or unspecified type diabetes mellitus without mention of complication, not stated as uncontrolled    Unspecified essential hypertension     Past Surgical History:   Procedure Laterality Date    Av fistula revision, open      Knee arthroscopy Bilateral       reports that she quit smoking about 7 years ago. She started smoking about 52 years ago. She has never used smokeless tobacco. She reports current alcohol use. She reports that she does not use drugs.      Allergies:  @ALLERGY    Laboratory Data:    Recent Labs   Lab   0000 25  2036 25  2148 25  2156 25  0232 25  0637 25  1203 25  0636 25  1222 25  2130 25  0521 25  0648 25  1326   WBC  --  13.0*  --   --   --  9.5  --  7.4  --   --   --   --   --    HGB  --  10.6*  --   --   --  9.6*  --  8.6*  --   --   --   --   --    HCT  --  34.1*  --   --   --  30.8*  --  28.3*  --   --   --   --   --    PLT  --  248.0  --   --   --  200.0  --  174.0  --   --   --   --   --    CREATSERUM   < >  --  3.57*  --   --  4.01*  --  5.01*  --    --   --  3.90*  --    BUN   < >  --  29*  --   --  35*  --  43*  --   --   --  36*  --    GLU   < >  --  120*  --   --  95  --  95  --   --   --  98  --    CA   < >  --  10.5  --   --  10.7*  --  10.0  --   --   --  9.8  --    ALB  --   --  4.0  --   --   --   --  3.5  --   --   --   --   --    TP  --   --  6.9  --   --   --   --  6.1  --   --   --   --   --    ESRML  --   --   --   --   --  74*  --   --   --   --   --   --   --    CRP  --   --   --   --   --  11.60*  --   --   --   --   --   --   --    ETOH  --   --   --  3*  --   --   --   --   --   --   --   --   --    PGLU  --   --   --   --    < >  --    < >  --    < > 122* 107*  --  140*    < > = values in this interval not displayed.         ASSESSMENT:  Wound 04/05/25 Tibial Proximal;Right (Active)   Date First Assessed/Time First Assessed: 04/05/25 1000   Present on Original Admission: Yes  Primary Wound Type: Amputation  Location: Tibial  Wound Location Orientation: Proximal;Right  Wound Description (Comments): Right lower leg BKA      Assessments 4/7/2025  3:09 PM   Wound Image     Drainage Amount Unable to assess   Treatments Cleansed   Dressing Changed New   Dressing Status Clean;Dry;Intact   Wound Length (cm) 10 cm   Wound Width (cm) 12 cm   Wound Surface Area (cm^2) 120 cm^2   Wound Depth (cm) 0.3 cm   Wound Volume (cm^3) 36 cm^3   Margins Well-defined edges   Non-staged Wound Description Full thickness   Natalee-wound Assessment Dry;Edema;Painful;Pink   Wound Granulation Tissue Pink;Pale Kelly;Firm   Wound Bed Granulation (%) 75 %   Wound Bed Slough (%) 25 %   Wound Odor None   Shape hypergranular   Tunneling? No   Undermining? No   Sinus Tracts? No        Edema : Non-pitting  Pulse: palpable popliteal pulses  Capillary refill: < 3 seconds  Lower Extremity Temp:Cool bilaterally    Wound Cleaning and Dressings:  Wound cleansing:  Cleanse with normal saline or wound cleanser  Wound cleaning frequency: Daily  Wound product: honey-gel, alginate, ABD pad,  kerlix x2 anchored above the knee, tape   Dressing change frequency:  Change dressing daily and/or PRN    ALL WOUND CARE SUPPLIES CAN BE OBTAINED FROM [facility]     Miscellaneous/Additional Orders:  Off loading: Turn patient every 2 hours    If patient is Diabetic: want to make sure blood sugars are within a controled range for wound healing     Protein intake: depending on providers recommendations and patients kidney functions - if kidneys are good then recommend patient to increase protein intake (Boost, Drake, Ensure, Premiere Protein)       Additional Notes:  Patient stated that she had a BKA in the first week of December of last year. She states that the site has not healed and that she has seen a wound care center in [place] where she had gotten her BKA. Patient had just gotten back from an MRI which is suggestive of possible osteomyelitis. **Wound-vac not applied to wound at this time due to depth of 0.3cm and slough/necrotic tissue to wound bed.** May reassess the need for NPWT at a later time, but NPWT is not appropriate for the wound at this time.    Recommendations:   -If patient is discharged home, may follow up with home health and/or outpatient wound care clinic.   -Elevate legs to reduce and/or prevent swelling  -Follow up with original surgeon    Thank you for this consultation and for allowing me to participate in the care of your patient. Please call [phone] if you have any questions about this consultation and plan of care.     Time Spent 30 Minutes.    Thank you,  Rigo Barrow RN BSN  Wound/Ostomy/Continence nurse    4/7/2025  3:36 PM

## 2025-04-07 NOTE — PROGRESS NOTES
INFECTIOUS DISEASE  PROGRESS NOTE            [facility]    Rosario Mcmullen Patient Status:  Inpatient    1955 MRN [id number]   [facility] 5NW-A Attending Merlene Duncan,    Hosp Day # 1 PCP Sal Montalvo MD     Antibiotics: Cefepime#2  Vanc #2    Subjective:  : co pain in the right BKA stump  Dressing changed    Objective:  Temp:  [98 °F (36.7 °C)-98.6 °F (37 °C)] 98 °F (36.7 °C)  Pulse:  [63-73] 67  Resp:  [16-18] 17  BP: (112-189)/(37-58) 151/37  SpO2:  [93 %-99 %] 94 %    General: awake alert  Vital signs:Temp:  [98 °F (36.7 °C)-98.6 °F (37 °C)] 98 °F (36.7 °C)  Pulse:  [63-73] 67  Resp:  [16-18] 17  BP: (112-189)/(37-58) 151/37  SpO2:  [93 %-99 %] 94 %  HEENT: Moist mucous membranes. Extraocular muscles are intact.  Neck: supple no masses  Respiratory: Non labored, symmetric excursion, normal respirations  Cardiovascular: no irregularities in rhythm  Abdomen: Soft, nontender, nondistended.   Musculoskeletal: right BKA stump, green discharge, and slough    Labs:     COVID-19 Lab Results    COVID-19  Lab Results   Component Value Date    COVID19 Not Detected 2025       Pro-Calcitonin  No results for input(s): \"PCT\" in the last 168 hours.    Cardiac  No results for input(s): \"TROP\", \"PBNP\" in the last 168 hours.    Creatinine Kinase  No results for input(s): \"CK\" in the last 168 hours.    Inflammatory Markers  Recent Labs   Lab 25  0637   CRP 11.60*       Recent Labs   Lab 25  0637 25  0636   RBC 2.96* 2.68*   HGB 9.6* 8.6*   HCT 30.8* 28.3*   .1* 105.6*   MCH 32.4 32.1   MCHC 31.2 30.4*   RDW 15.0 15.2   NEPRELIM 8.19*  --    WBC 9.5 7.4   .0 174.0         Recent Labs   Lab 25  2148 25  0637 25  0636 25  0648   * 95 95 98   BUN 29* 35* 43* 36*   CREATSERUM 3.57* 4.01* 5.01* 3.90*   CA 10.5 10.7* 10.0 9.8   ALB 4.0  --  3.5  --     138 135* 136   K 5.1 5.2* 5.4* 4.3   CL 99 102 99 98   CO2  29.0 30.0 28.0 29.0   ALKPHO 71  --  62  --    AST 15  --  14  --    ALT 13  --  16  --    BILT <0.2*  --  <0.2*  --    TP 6.9  --  6.1  --        No results found for: \"VANCT\"  Microbiology    Hospital Encounter on 04/04/25   1. Blood Culture     Status: None (Preliminary result)    Collection Time: 04/05/25  7:50 AM    Specimen: Blood,peripheral   Result Value Ref Range    Blood Culture Result No Growth 2 Days N/A         Radiology    MRI: focal marrow edema, no abscess    Problem list reviewed:  Patient Active Problem List   Diagnosis    Shortness of breath    Chest pain on breathing    Benign essential HTN    Renal insufficiency    Type 2 diabetes mellitus with diabetic peripheral angiopathy without gangrene, without long-term current use of insulin (HCC)    Chronic diastolic (congestive) heart failure (HCC)    Morbid obesity (HCC)    Mixed hyperlipidemia    Bronchitis    Bronchospasm    Chest pain, atypical    Elevated d-dimer    Left foot pain    Type 2 diabetes mellitus with hyperglycemia, unspecified whether long term insulin use (HCC)    Anemia in ESRD (end-stage renal disease) (HCC)    Stage 4 chronic kidney disease (HCC)    ESRD (end stage renal disease) (HCC)    HTN (hypertension), benign    Preop cardiovascular exam    Cellulitis of right lower extremity    Abdominal pain of unknown etiology    ESRD on hemodialysis (HCC)    Hypertension    Hyperkalemia             ASSESSMENT/PLAN:  1. Right BKA stump wound  Non healing, was on wound vac prior to admission  -slough and green discharge superficially  MRI shows no deep abscess, but concern for possible early OM    -culture take from site  Wound care following  Surgery was in [place], and hospitalist reaching out to her surgeon    2. ESRD      Griffin Ragsdale MD, MD [facility]  [phone]

## 2025-04-07 NOTE — PHYSICAL THERAPY NOTE
PT order placed by RN, chart reviewed.  Pt was seen by PT for prolonged time earlier this year.  Pt dc'ed in  March with recs for mechanical lift and power chair.  Will dc as pt presents at baseline of dependent.

## 2025-04-08 ENCOUNTER — APPOINTMENT (OUTPATIENT)
Dept: CT IMAGING | Facility: HOSPITAL | Age: 70
End: 2025-04-08
Attending: SURGERY
Payer: MEDICARE

## 2025-04-08 LAB
ANION GAP SERPL CALC-SCNC: 9 MMOL/L (ref 0–18)
BUN BLD-MCNC: 43 MG/DL (ref 9–23)
CALCIUM BLD-MCNC: 9.6 MG/DL (ref 8.7–10.6)
CHLORIDE SERPL-SCNC: 98 MMOL/L (ref 98–112)
CO2 SERPL-SCNC: 27 MMOL/L (ref 21–32)
CREAT BLD-MCNC: 4.7 MG/DL
EGFRCR SERPLBLD CKD-EPI 2021: 10 ML/MIN/1.73M2 (ref 60–?)
ERYTHROCYTE [DISTWIDTH] IN BLOOD BY AUTOMATED COUNT: 15 %
GLUCOSE BLD-MCNC: 108 MG/DL (ref 70–99)
GLUCOSE BLD-MCNC: 137 MG/DL (ref 70–99)
GLUCOSE BLD-MCNC: 161 MG/DL (ref 70–99)
GLUCOSE BLD-MCNC: 96 MG/DL (ref 70–99)
HCT VFR BLD AUTO: 27.1 %
HGB BLD-MCNC: 8.6 G/DL
MCH RBC QN AUTO: 33.5 PG (ref 26–34)
MCHC RBC AUTO-ENTMCNC: 31.7 G/DL (ref 31–37)
MCV RBC AUTO: 105.4 FL
OSMOLALITY SERPL CALC.SUM OF ELEC: 289 MOSM/KG (ref 275–295)
PLATELET # BLD AUTO: 199 10(3)UL (ref 150–450)
POTASSIUM SERPL-SCNC: 4.9 MMOL/L (ref 3.5–5.1)
RBC # BLD AUTO: 2.57 X10(6)UL
SODIUM SERPL-SCNC: 134 MMOL/L (ref 136–145)
WBC # BLD AUTO: 10.3 X10(3) UL (ref 4–11)

## 2025-04-08 PROCEDURE — 99233 SBSQ HOSP IP/OBS HIGH 50: CPT | Performed by: INTERNAL MEDICINE

## 2025-04-08 PROCEDURE — 99232 SBSQ HOSP IP/OBS MODERATE 35: CPT | Performed by: INTERNAL MEDICINE

## 2025-04-08 PROCEDURE — 75635 CT ANGIO ABDOMINAL ARTERIES: CPT | Performed by: SURGERY

## 2025-04-08 RX ORDER — METOCLOPRAMIDE HYDROCHLORIDE 5 MG/ML
5 INJECTION INTRAMUSCULAR; INTRAVENOUS DAILY PRN
Status: DISCONTINUED | OUTPATIENT
Start: 2025-04-08 | End: 2025-04-10

## 2025-04-08 NOTE — PROGRESS NOTES
[facility]   part of [facility]     Hospitalist Progress Note     Rosario JARAMILLO Reji Mcmullen Patient Status:  Observation    1955 MRN [id number]   Location [facility] 5NW-A Attending Merlene Duncan,    Hosp Day # 2 PCP Sal Montalvo MD     Chief Complaint: fever, emesis    Subjective:     No new complaints    Objective:    Review of Systems:   A comprehensive review of systems was completed; pertinent positive and negatives stated in subjective.    Vital signs:  Temp:  [97.7 °F (36.5 °C)-98.8 °F (37.1 °C)] 98.1 °F (36.7 °C)  Pulse:  [53-68] 53  Resp:  [18-20] 18  BP: (105-186)/(44-52) 105/52  SpO2:  [91 %-99 %] 99 %    Physical Exam:    General: No acute distress  Respiratory: No wheezes, no rhonchi  Cardiovascular: S1, S2, regular rate and rhythm  Abdomen: Soft, Non-tender, non-distended, positive bowel sounds  Neuro: No new focal deficits.   Extremities: R BKA stump with dressing    Diagnostic Data:    Labs:  Recent Labs   Lab 25  0637 25  0636 25  0639   WBC 13.0* 9.5 7.4 10.3   HGB 10.6* 9.6* 8.6* 8.6*   .6* 104.1* 105.6* 105.4*   .0 200.0 174.0 199.0       Recent Labs   Lab 25  0637 25  0636 25  0648 25  0639   *   < > 95 98 108*   BUN 29*   < > 43* 36* 43*   CREATSERUM 3.57*   < > 5.01* 3.90* 4.70*   CA 10.5   < > 10.0 9.8 9.6   ALB 4.0  --  3.5  --   --       < > 135* 136 134*   K 5.1   < > 5.4* 4.3 4.9   CL 99   < > 99 98 98   CO2 29.0   < > 28.0 29.0 27.0   ALKPHO 71  --  62  --   --    AST 15  --  14  --   --    ALT 13  --  16  --   --    BILT <0.2*  --  <0.2*  --   --    TP 6.9  --  6.1  --   --     < > = values in this interval not displayed.       Estimated Glomerular Filtration Rate: 10 mL/min/1.73m2 (A) (result from lab).    No results for input(s): \"TROP\", \"TROPHS\", \"CK\" in the last 168 hours.    No results for input(s): \"PTP\", \"INR\" in the last 168 hours.                Microbiology    Hospital Encounter on 04/04/25   1. Aerobic Bacterial Culture     Status: Abnormal (Preliminary result)    Collection Time: 04/07/25  2:34 PM    Specimen: Leg, right; Other   Result Value Ref Range    Aerobic Culture Result 2+ growth Pseudomonas aeruginosa (A) N/A    Aerobic Smear No WBCs seen N/A    Aerobic Smear No organisms seen N/A   2. Blood Culture     Status: None (Preliminary result)    Collection Time: 04/05/25  7:50 AM    Specimen: Blood,peripheral   Result Value Ref Range    Blood Culture Result No Growth 3 Days N/A         Imaging: Reviewed in Epic.    Medications:    [START ON 4/9/2025] epoetin jaylin  10,000 Units Intravenous Once    multivitamin  1 tablet Oral Daily    cefepime  1 g Intravenous Q24H    Vancomycin IV  1 each Intravenous See Admin Instructions (RX holding)    heparin  5,000 Units Subcutaneous 2 times per day    insulin aspart  2-10 Units Subcutaneous TID AC and HS    sennosides  8.6 mg Oral BID    sodium chloride  1 g Oral TID CC    allopurinol  100 mg Oral Daily    buPROPion SR  300 mg Oral BID    carvedilol  25 mg Oral BID with meals    cinacalcet  30 mg Oral Daily    gabapentin  200 mg Oral Nightly    nystatin   Topical BID    pantoprazole  40 mg Oral QAM AC    sevelamer carbonate  1,600 mg Oral TID CC    ondansetron  4 mg Intravenous Once       Assessment & Plan:      #Suspected Right stump Cellulitis c/f possible osteomyelitis   #Sepsis 2/2 above   Patient presented with abdominal pain. COVID, Flu, RSV negative  -CT showing LLQ abdominal wall fat stranding and calcification (on exam no skin erythema here to support abdominal wall cellulitis)  -CT RLE with considerable soft tissue edema, most prominent within the stump, no walled-off fluid collection. Possible subtle cortical irregularity of the distal tibial stump, possible osteomyelitis   -MR with focal marrow edema distal amputated margin of tibia early osteomyelitis, circumferential superficial edema  around leg and edema within muscles and intermuscular fascial planes could represent cellulitis/myositis/fasciitis  -antibiotics:cefepime  -ID following  -Follow cultures, wound culture here with PSA  -wound care following  Discharge planning; plan for IV antibiotics at discharge pending cultures    #Peripheral vascular disease  CTA in 01/2025 with aortoiliac inflow to both lower extremities patient. Right lower with severe stenosis at origin of the profunda femoris and diffuse disease of the femoral-popliteal arteries resulting in moderate to severe diffuse stenosis. Based on prior vascular surgery note at OSH in 01/2025, revascularization, RAKA were offered and patient decided to continue wound care and follow up with Dr. Maguire (Vascular surgeon in [place]). CTA done here and vascular offered angiogram and profunda angioplasty with shockwave lithotripsy followed by AKA. Patient is not interested in procedures here and has a follow up with Dr. Maguire on 4/22/2025.     #macrocytic anemia  -hgb 10.6: last 9.1  -no signs acute bleeding  -cont to monitor CBC     #ESRD  -HD MWF  -if still in hospital on Monday will need nephrology c/s  -cont to monitor BMP  -cont home sevelamer, cinacelcet     #DM  -SSI, QID checks, hypoglycemia protocol     #HTN  -continue coreg and losartan. Discontinue amlodipine, hydralazine     #HFpEF  -cont home carvedilol     #chronic pain  -cont home gabapentin     #GERD  -cont home PPI     #mood disorder  -cont home bupropion     #gout  -cont home allopurinol    Guadalupe Huang MD    Supplementary Documentation:     Quality:  DVT Mechanical Prophylaxis:   SCDs,    DVT Pharmacologic Prophylaxis   Medication    heparin (Porcine) 5000 UNIT/ML injection 5,000 Units                Code Status: Full Code  Painter: No urinary catheter in place  Painter Duration (in days):   Central line:    REAGAN:     Discharge is dependent on: course  At this point Ms. Reji Mcmullen is expected to be discharge to:  unclear    The 21st Century Cures Act makes medical notes like these available to patients in the interest of transparency. Please be advised this is a medical document. Medical documents are intended to carry relevant information, facts as evident, and the clinical opinion of the practitioner. The medical note is intended as peer to peer communication and may appear blunt or direct. It is written in medical language and may contain abbreviations or verbiage that are unfamiliar.

## 2025-04-08 NOTE — PROGRESS NOTES
INFECTIOUS DISEASE  PROGRESS NOTE            [facility]    Rosario Mcmullen Patient Status:  Inpatient    1955 MRN [id number]   [facility] 5NW-A Attending Merlene Duncan,    Hosp Day # 2 PCP Sal Montalvo MD     Antibiotics: Cefepime#3    Subjective:  resting    Objective:  Temp:  [97.7 °F (36.5 °C)-98.8 °F (37.1 °C)] 98.1 °F (36.7 °C)  Pulse:  [53-68] 53  Resp:  [18-20] 18  BP: (105-186)/(44-52) 105/52  SpO2:  [91 %-99 %] 99 %    General: awake alert  Vital signs:Temp:  [97.7 °F (36.5 °C)-98.8 °F (37.1 °C)] 98.1 °F (36.7 °C)  Pulse:  [53-68] 53  Resp:  [18-20] 18  BP: (105-186)/(44-52) 105/52  SpO2:  [91 %-99 %] 99 %  HEENT: Moist mucous membranes. Extraocular muscles are intact.  Neck: supple no masses  Port in place  Respiratory: Non labored, symmetric excursion, normal respirations  Cardiovascular: no irregularities in rhythm  Abdomen: Soft, nontender, nondistended.   Musculoskeletal: right BKA stump dressed    COVID-19 Lab Results    COVID-19  Lab Results   Component Value Date    COVID19 Not Detected 2025       Pro-Calcitonin  No results for input(s): \"PCT\" in the last 168 hours.    Cardiac  No results for input(s): \"TROP\", \"PBNP\" in the last 168 hours.    Creatinine Kinase  No results for input(s): \"CK\" in the last 168 hours.    Inflammatory Markers  Recent Labs   Lab 25  0637   CRP 11.60*       Recent Labs   Lab 25  0637 25  0636 25  0639   RBC 2.96*   < > 2.57*   HGB 9.6*   < > 8.6*   HCT 30.8*   < > 27.1*   .1*   < > 105.4*   MCH 32.4   < > 33.5   MCHC 31.2   < > 31.7   RDW 15.0   < > 15.0   NEPRELIM 8.19*  --   --    WBC 9.5   < > 10.3   .0   < > 199.0    < > = values in this interval not displayed.         Recent Labs   Lab 25  2148 25  0637 25  0636 25  0648 25  0639   *   < > 95 98 108*   BUN 29*   < > 43* 36* 43*   CREATSERUM 3.57*   < > 5.01* 3.90* 4.70*   CA 10.5   < >  10.0 9.8 9.6   ALB 4.0  --  3.5  --   --       < > 135* 136 134*   K 5.1   < > 5.4* 4.3 4.9   CL 99   < > 99 98 98   CO2 29.0   < > 28.0 29.0 27.0   ALKPHO 71  --  62  --   --    AST 15  --  14  --   --    ALT 13  --  16  --   --    BILT <0.2*  --  <0.2*  --   --    TP 6.9  --  6.1  --   --     < > = values in this interval not displayed.       No results found for: \"VANCT\"  Microbiology    Hospital Encounter on 04/04/25   1. Aerobic Bacterial Culture     Status: None (Preliminary result)    Collection Time: 04/07/25  2:34 PM    Specimen: Leg, right; Other   Result Value Ref Range    Aerobic Smear No WBCs seen N/A    Aerobic Smear No organisms seen N/A   2. Blood Culture     Status: None (Preliminary result)    Collection Time: 04/05/25  7:50 AM    Specimen: Blood,peripheral   Result Value Ref Range    Blood Culture Result No Growth 3 Days N/A         Radiology    MRI: focal marrow edema, no abscess    Problem list reviewed:  Patient Active Problem List   Diagnosis    Shortness of breath    Chest pain on breathing    Benign essential HTN    Renal insufficiency    Type 2 diabetes mellitus with diabetic peripheral angiopathy without gangrene, without long-term current use of insulin (HCC)    Chronic diastolic (congestive) heart failure (HCC)    Morbid obesity (HCC)    Mixed hyperlipidemia    Bronchitis    Bronchospasm    Chest pain, atypical    Elevated d-dimer    Left foot pain    Type 2 diabetes mellitus with hyperglycemia, unspecified whether long term insulin use (HCC)    Anemia in ESRD (end-stage renal disease) (HCC)    Stage 4 chronic kidney disease (HCC)    ESRD (end stage renal disease) (HCC)    HTN (hypertension), benign    Preop cardiovascular exam    Cellulitis of right lower extremity    Abdominal pain of unknown etiology    ESRD on hemodialysis (HCC)    Hypertension    Hyperkalemia             ASSESSMENT/PLAN:  1. Right BKA stump wound  Non healing, was on wound vac prior to admission  -slough and  green discharge superficially  -Pseudomonas isolated from preliminary culture      2. ESRD    Continue Cefepime pending susceptibilities  DC vancomycin    DW Dr. Huang who has been in contact with her vascular surgeon in [place]  Can discharge on IVABX once cultures final        Griffin Ragsdale MD, MD [facility]  [phone]

## 2025-04-08 NOTE — PLAN OF CARE
Pt A&Ox4. On RA. On  and tele. Vitals stable. Anuric, briefed. Dejah lift. Accucheck QID. Acetaminophen. Zofran. Cefepime. Vanco. Pt updated on plan of care, all questions and concerns addressed, verbalized understanding. Safety precautions in place, no further needs at this time.     Problem: Patient/Family Goals  Goal: Patient/Family Long Term Goal  Description: Patient's Long Term Goal:   Discharge back to facility  Interventions:-   Follow POC  - See additional Care Plan goals for specific interventions  Outcome: Progressing  Goal: Patient/Family Short Term Goal  Description: Patient's Short Term Goal: 4/4 NOC:Rest  4/5 NOC:rest  4/7 NOC: Sleep  Interventions: - cluster care  - See additional Care Plan goals for specific interventions  Outcome: Progressing     Problem: PAIN - ADULT  Goal: Verbalizes/displays adequate comfort level or patient's stated pain goal  Description: INTERVENTIONS:- Encourage pt to monitor pain and request assistance- Assess pain using appropriate pain scale- Administer analgesics based on type and severity of pain and evaluate response- Implement non-pharmacological measures as appropriate and evaluate response- Consider cultural and social influences on pain and pain management- Manage/alleviate anxiety- Utilize distraction and/or relaxation techniques- Monitor for opioid side effects- Notify MD/LIP if interventions unsuccessful or patient reports new pain- Anticipate increased pain with activity and pre-medicate as appropriate  Outcome: Progressing     Problem: GASTROINTESTINAL - ADULT  Goal: Minimal or absence of nausea and vomiting  Description: INTERVENTIONS:- Maintain adequate hydration with IV or PO as ordered and tolerated- Nasogastric tube to low intermittent suction as ordered- Evaluate effectiveness of ordered antiemetic medications- Provide nonpharmacologic comfort measures as appropriate- Advance diet as tolerated, if ordered- Obtain nutritional consult as needed-  Evaluate fluid balance  Outcome: Progressing     Problem: SKIN/TISSUE INTEGRITY - ADULT  Goal: Incision(s), wounds(s) or drain site(s) healing without S/S of infection  Description: INTERVENTIONS:- Assess and document risk factors for pressure ulcer development- Assess and document skin integrity- Assess and document dressing/incision, wound bed, drain sites and surrounding tissue- Implement wound care per orders- Initiate isolation precautions as appropriate- Initiate Pressure Ulcer prevention bundle as indicated  Outcome: Progressing

## 2025-04-08 NOTE — PROGRESS NOTES
Patient seen and examined   I reviewed CT angiogram   Discussed physical exam and CT angiogram with patient     She would require an angiogram and profunda angioplasty with shockwave lithotripsy followed by an above knee amputation     She is not interested in procedures at our facility   She wants to be evaluated by her surgeon in [place]    Recommend conservative management or transfer

## 2025-04-08 NOTE — PLAN OF CARE
Patient AAOx4. Room air, saturating WNL. 2L O2 at night. Tele-NSR. 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 Patient on IV antibiotics. MRI RLE completed and results relayed to hospitalist and ID. Orthopedics, Dr. Lopez consulted. Vascular surgery consulted, Dr. Marino made aware, CTA pending completion, cleared for CTA by nephrology Dr. Nj. Seen by wound care team. Up with lift. [facility] patient. Left arm fistula, left limb px. Poor PO intake, reports intermittent nausea, declined PRN nausea medication. Seen by dietician. Safety precautions in place. Patient and nephew at bedside updated on POC, answered, understanding verbalized.      Problem: Patient/Family Goals  Goal: Patient/Family Long Term Goal  Description: Patient's Long Term Goal:   Discharge back to facility with appropriate means    Interventions:  -see consults  -imaging as ordered  -labs as ordered  -antibiotics   Follow POC  - See additional Care Plan goals for specific interventions  Outcome: Progressing  Goal: Patient/Family Short Term Goal  Description: Patient's Short Term Goal: 4/4 NOC:Rest  4/5 NOC:rest  4/7 NOC: Sleep  Interventions: - cluster care  - See additional Care Plan goals for specific interventions  Outcome: Progressing     Problem: PAIN - ADULT  Goal: Verbalizes/displays adequate comfort level or patient's stated pain goal  Description: INTERVENTIONS:- Encourage pt to monitor pain and request assistance- Assess pain using appropriate pain scale- Administer analgesics based on type and severity of pain and evaluate response- Implement non-pharmacological measures as appropriate and evaluate response- Consider cultural and social influences on pain and pain management- Manage/alleviate anxiety- Utilize distraction and/or relaxation techniques- Monitor for opioid side effects- Notify MD/LIP if interventions unsuccessful or patient reports new pain- Anticipate increased pain  with activity and pre-medicate as appropriate  Outcome: Progressing     Problem: SKIN/TISSUE INTEGRITY - ADULT  Goal: Incision(s), wounds(s) or drain site(s) healing without S/S of infection  Description: INTERVENTIONS:- Assess and document risk factors for pressure ulcer development- Assess and document skin integrity- Assess and document dressing/incision, wound bed, drain sites and surrounding tissue- Implement wound care per orders- Initiate isolation precautions as appropriate- Initiate Pressure Ulcer prevention bundle as indicated  Outcome: Progressing     Problem: GASTROINTESTINAL - ADULT  Goal: Minimal or absence of nausea and vomiting  Description: INTERVENTIONS:- Maintain adequate hydration with IV or PO as ordered and tolerated- Nasogastric tube to low intermittent suction as ordered- Evaluate effectiveness of ordered antiemetic medications- Provide nonpharmacologic comfort measures as appropriate- Advance diet as tolerated, if ordered- Obtain nutritional consult as needed- Evaluate fluid balance  Outcome: Progressing

## 2025-04-08 NOTE — CM/SW NOTE
04/08/25 1500   CM/SW Referral Data   Referral Source Social Work (self-referral)   Reason for Referral Discharge planning   Informant Patient;EMR;Clinical Staff Member   Patient Info   Patient's Current Mental Status at Time of Assessment Alert;Oriented   Discharge Needs   Anticipated D/C needs Subacute rehab;To be determined;Outpatient dialysis;Transportation services   Services Requested   Submitted to [facility] Yes   PASRR Level 1 Submitted No - Current within 90 days     Patient is a 70 y/o female who admitted with Abdominal pain of unknown etiology, Cellulitis of right lower extremity.   Anticipated therapy need: Gradual Rehabilitative Therapy    Met with patient, who was alert and oriented, to discuss discharge planning. She admitted from [facility]. She goes to [facility] for dialysis. She originally lives in [place] but was in [place], [place] living near her sister for 3.5 years. Discussed plan to return to [place] to finish out [facility]. She anticipates she'll need an ambulance for transport.    Sent referral to [place] on aidin. Per Dr. Ragsdale note, anticipate IV abx at discharge.     SW/CM to remain available for support and/or discharge planning.    Domi BRYANT LCSW  Discharge Planner

## 2025-04-08 NOTE — PROGRESS NOTES
[facility]   part of [facility]     Nephrology Progress Note    Rosario JARAMILLO Reji Gandhililiya Patient Status:  Inpatient    1955 MRN [id number]   [facility] 5NW-A Attending Merlene Duncan,    Hosp Day # 2 PCP Sal Montalvo MD       SUBJECTIVE:  Denies complaints this morning    Current Facility-Administered Medications   Medication Dose Route Frequency    multivitamin (Dialyvite - Renal) tab 1 tablet  1 tablet Oral Daily    ceFEPIme (Maxipime) 1 g in sodium chloride 0.9% 100 mL IVPB-MBP  1 g Intravenous Q24H    Vancomycin: PHARMACY DOSING  1 each Intravenous See Admin Instructions (RX holding)    glucose (Dex4) 15 GM/59ML oral liquid 15 g  15 g Oral Q15 Min PRN    Or    glucose (Glutose) 40% oral gel 15 g  15 g Oral Q15 Min PRN    Or    glucose-vitamin C (Dex-4) chewable tab 4 tablet  4 tablet Oral Q15 Min PRN    Or    dextrose 50% injection 50 mL  50 mL Intravenous Q15 Min PRN    Or    glucose (Dex4) 15 GM/59ML oral liquid 30 g  30 g Oral Q15 Min PRN    Or    glucose (Glutose) 40% oral gel 30 g  30 g Oral Q15 Min PRN    Or    glucose-vitamin C (Dex-4) chewable tab 8 tablet  8 tablet Oral Q15 Min PRN    heparin (Porcine) 5000 UNIT/ML injection 5,000 Units  5,000 Units Subcutaneous 2 times per day    acetaminophen (Tylenol Extra Strength) tab 500 mg  500 mg Oral Q4H PRN    melatonin tab 3 mg  3 mg Oral Nightly PRN    ondansetron (Zofran) 4 MG/2ML injection 4 mg  4 mg Intravenous Q6H PRN    metoclopramide (Reglan) 5 mg/mL injection 5 mg  5 mg Intravenous Q8H PRN    polyethylene glycol (PEG 3350) (Miralax) 17 g oral packet 17 g  17 g Oral Daily PRN    bisacodyl (Dulcolax) 10 MG rectal suppository 10 mg  10 mg Rectal Daily PRN    insulin aspart (NovoLOG) 100 Units/mL FlexPen 2-10 Units  2-10 Units Subcutaneous TID AC and HS    benzonatate (Tessalon) cap 200 mg  200 mg Oral TID PRN    glycerin-hypromellose- (Artificial Tears) 0.2-0.2-1 % ophthalmic solution 1 drop  1 drop Both  Eyes QID PRN    sodium chloride (Saline Mist) 0.65 % nasal solution 1 spray  1 spray Each Nare Q3H PRN    sennosides (Senokot) tab 8.6 mg  8.6 mg Oral BID    sodium chloride tab 1 g  1 g Oral TID CC    allopurinol (Zyloprim) tab 100 mg  100 mg Oral Daily    buPROPion SR (WELLBUTRIN SR) 12 hr tab 300 mg  300 mg Oral BID    carvedilol (Coreg) tab 25 mg  25 mg Oral BID with meals    cinacalcet (Sensipar) tab 30 mg  30 mg Oral Daily    diphenhydrAMINE (Benadryl) cap/tab 25 mg  25 mg Oral Q6H PRN    gabapentin (Neurontin) cap 200 mg  200 mg Oral Nightly    nystatin (Mycostatin) 100,000 Units/g cream   Topical BID    pantoprazole (Protonix) DR tab 40 mg  40 mg Oral QAM AC    sevelamer carbonate (Renvela) tab 1,600 mg  1,600 mg Oral TID CC    lidocaine-prilocaine (Emla) 2.5-2.5 % cream   Topical PRN    ondansetron (Zofran) 4 MG/2ML injection 4 mg  4 mg Intravenous Once           Physical Exam:   /48 (BP Location: Right arm)   Pulse 56   Temp 98.1 °F (36.7 °C) (Oral)   Resp 18   Ht 5' 8\" (1.727 m)   Wt 230 lb (104.3 kg)   SpO2 91%   BMI 34.97 kg/m²   Temp (24hrs), Av.3 °F (36.8 °C), Min:97.7 °F (36.5 °C), Max:98.8 °F (37.1 °C)       Intake/Output Summary (Last 24 hours) at 2025 1015  Last data filed at 2025 1850  Gross per 24 hour   Intake 360 ml   Output --   Net 360 ml     Last 3 Weights   25 0633 230 lb (104.3 kg)   25 0121 227 lb (103 kg)   25 0109 227 lb 11.8 oz (103.3 kg)   25 2040 149 lb 14.6 oz (68 kg)   21 1515 291 lb 0.1 oz (132 kg)   19 1118 295 lb 6.7 oz (134 kg)   18 1259 300 lb (136.1 kg)   18 1502 300 lb (136.1 kg)     General: Alert and oriented in no apparent distress.  HEENT: No scleral icterus, MMM  Neck: Supple, no BERTHA or thyromegaly  Cardiac: Regular rate and rhythm, S1, S2 normal, no murmur or rub  Lungs: Clear without wheezes, rales, rhonchi.    Abdomen: Soft, non-tender. + bowel sounds, no palpable organomegaly  Extremities:  Without clubbing, cyanosis or edema. R BKA noted, left AV fistula with bruit and thrill  Neurologic: Alert and oriented, cranial nerves grossly intact, moving all extremities  Skin: Warm and dry, no rash      Recent Labs     04/06/25  0636 04/08/25  0639   WBC 7.4 10.3   HGB 8.6* 8.6*   .6* 105.4*   .0 199.0       Recent Labs     04/06/25  0636 04/07/25  0648 04/08/25  0639   * 136 134*   K 5.4* 4.3 4.9   CL 99 98 98   CO2 28.0 29.0 27.0   BUN 43* 36* 43*   CREATSERUM 5.01* 3.90* 4.70*   CA 10.0 9.8 9.6   MG  --  1.9  --        Recent Labs     04/06/25  0636   ALT 16   AST 14   ALB 3.5             Impression/Plan:      ESRD - on HD ; MWF, was dialyzed yesterday off schedule and plan next dialysis on Wednesday  PAD/S/p BKA; suspect stump infection- recent tx @ OSH  - w/u ongoing   - would care; vascular following   - abx  Anemia due to CKD/chronic disease  ANITA w/ HD  DM  HTN  PAD  Hx of CHF; stable         Questions/concerns were discussed with patient and/or family by bedside.    Rudolph Reyes  4/8/2025

## 2025-04-08 NOTE — CONSULTS
Bianka H[facility]   Vascular and Endovascular Surgery     VASCULAR SURGERY   CONSULT NOTE      Name: Rosario Mcmullen   :   1955  [id number]     2025       REFERRING PHYSICIAN: Merlene Duncan DO  PRIMARY CARE PHYSICIAN:  Sal Montalvo MD    HISTORY OF PRESENT ILLNESS: Patient is a 69 year old female with a history of hypertension, ESRD on dialysis, COPD, hyperlipidemia, who presents with infection of the right BKA. Patient has a history of a left BKA in 2024.  Subsequently, she underwent a right BKA in 2024 for due to nonhealing wound of the right heel.  Patient had difficulty healing her incision site using wet-to-dry dressing changes followed by wound VAC application.  Patient underwent a right stump debridement on 2025. Patient was discharged to rehab with a wound VAC. Prior interventions have been completed at [facility].  Patient lives in [place], however she has been in rehab in [place], [place], to be close to family.  Patient has a history of smoking, quit in , however she had 2 cigarettes last week.  Patient had a CT a scan overnight. Patient is on IV vancomycin and cefepime.     PAST MEDICAL HISTORY:    Past Medical History:    Arthritis    Bronchitis    COPD (chronic obstructive pulmonary disease) (HCC)    Esophageal reflux    ESRD (end stage renal disease) (HCC)    High blood pressure    Kidney disorder    Neuropathy of leg    Renal disorder    Sleep apnea    Type II or unspecified type diabetes mellitus without mention of complication, not stated as uncontrolled    Unspecified essential hypertension       PAST SURGICAL HISTORY:   Past Surgical History:   Procedure Laterality Date    Av fistula revision, open      Knee arthroscopy Bilateral        MEDICATIONS:   Medications Ordered Prior to Encounter[1]     ALLERGIES:    She is allergic to neurontin [gabapentin].    SOCIAL HISTORY:    Patient  reports that she quit  smoking about 7 years ago. She started smoking about 52 years ago. She has never used smokeless tobacco. She reports current alcohol use. She reports that she does not use drugs.    FAMILY HISTORY:    Patient's family history includes Cancer in her mother; Heart Attack in her father.    ROS:    Comprehensive ROS reviewed and negative except for what's stated above.  Including negative for chest pain, shortness of breath, syncope.     EXAM:  /48 (BP Location: Right arm)   Pulse 56   Temp 98.1 °F (36.7 °C) (Oral)   Resp 18   Ht 5' 8\" (1.727 m)   Wt 230 lb (104.3 kg)   SpO2 91%   BMI 34.97 kg/m²   GENERAL: alert and oriented x3, in no apparent distress  PSYCH: Normal mood and affect  NEURO: Cranial nerves grossly intact. No sensory or motor deficits  RESPIRATORY: Normal work of breathing  SKIN: No rashes, warm and dry  MUSCULOSKELETAL: Strength 5/5 throughout, sensation intact  EXTREMITIES: +Right BKA incision site with purulent drainage    Diagnostic Data:      LABS:  Recent Labs   Lab 04/04/25 2036 04/05/25  0637 04/06/25  0636 04/08/25  0639   WBC 13.0* 9.5 7.4 10.3   HGB 10.6* 9.6* 8.6* 8.6*   .6* 104.1* 105.6* 105.4*   .0 200.0 174.0 199.0       Recent Labs   Lab 04/04/25  2148 04/05/25  0637 04/06/25  0636 04/07/25  0648 04/08/25  0639    138 135* 136 134*   K 5.1 5.2* 5.4* 4.3 4.9   CL 99 102 99 98 98   CO2 29.0 30.0 28.0 29.0 27.0   BUN 29* 35* 43* 36* 43*   CREATSERUM 3.57* 4.01* 5.01* 3.90* 4.70*   * 95 95 98 108*   CA 10.5 10.7* 10.0 9.8 9.6   MG  --   --   --  1.9  --      No results for input(s): \"PTP\", \"INR\", \"PTT\" in the last 168 hours.  Recent Labs   Lab 04/04/25  2148 04/06/25  0636   ALT 13 16   AST 15 14   ALB 4.0 3.5     No results for input(s): \"TROP\" in the last 168 hours.  No results found for: \"ANAS\", \"SREEDHAR\", \"ANASCRN\"  Recent Labs   Lab 04/06/25  1015   HBSAG Nonreactive       Radiology: CTA ABDOMEN/PELVIS LOWER EXT BILAT W RUNOFF (CPT=75635)    Result  Date: 4/8/2025  PROCEDURE:  CTA ABDOMEN/PELVIS LOWER EXT BILAT W RUNOFF (HOQ=84028/95218)  COMPARISON:  [facility] , CT, CT ABDOMEN+PELVIS(CPT=74176), 4/04/2025, 10:42 PM.  INDICATIONS:  aaa  TECHNIQUE:  CT images of the abdomen, pelvis, and lower extremities were obtained pre- and post- injection of non-ionic intravenous contrast material. Multi-planar reformatted/3-D images were created to optimize visualization of vascular anatomy.  Dose reduction techniques were used. Dose information is transmitted to the ACR (American College of Radiology) NRDR (National Radiology Data Registry) which includes the Dose Index Registry.  PATIENT STATED HISTORY:(As transcribed by Technologist)  Right lower extremity post right below knee amputation 4/7/25.   CONTRAST USED:  95cc of Isovue 370  FINDINGS:  AORTO-ILIAC:  There is diffuse aortic atherosclerosis with irregular ulcerated plaque.  There is no significant aortic stenosis or aneurysm.  Celiac axis and SMA are patent.  There is mild atherosclerotic disease in proximal SMA without significant stenosis.  Severe atherosclerotic disease is noted to involve renal arteries bilaterally with severe bilateral renal artery stenosis.  Accessory right renal artery to lower pole is noted with severe stenosis as well.  There is occlusion of the origin of the RONY with reconstitution of distal branches of the RONY probably via collateral flow from the SMA. RIGHT LEG:  Moderate calcified plaque throughout common and external iliac artery on the right is noted without stenosis.  There is moderate stenosis of approximately 40-50% in the common femoral artery.  There is severe multifocal stenosis or occlusion of the SFA throughout its entire course.  There is collateral flow from the profunda femoral artery in the thigh.  Patient has had a below-knee amputation. LEFT LEG:  There is mostly calcified plaque in common and external iliac arteries without significant stenosis.  The common femoral  artery is patent.  There is complete occlusion of the majority of the length of the SFA.  The popliteal artery appears to be reconstituted presumably via collateral flow from the profunda femoral artery.  The proximal peroneal artery and tibial artery appear to be patent.  Distal vessels would be obscured by artifact from the densely calcified plaque and suboptimal timing.  There has been prior amputation below the knee. LIVER:  No enlargement, atrophy, abnormal density, or significant focal lesion.  BILIARY:  There is cholelithiasis.  There is gallbladder wall thickening which is nonspecific.  Cholecystitis would not be excluded. PANCREAS:  No lesion, fluid collection, ductal dilatation, or atrophy.  SPLEEN:  No enlargement or focal lesion.  KIDNEYS:  Multiple low-density lesions in both kidneys are noted.  These are most likely cysts. ADRENALS:  Left adrenal mass containing macroscopic fat and calcification is noted.  This is probably a benign myelolipoma.  This finding is unchanged. AORTA/VASCULAR:  Described above. RETROPERITONEUM:  No mass or adenopathy.  BOWEL/MESENTERY:  There is diverticulosis of the colon.  There is no CT evidence of diverticulitis. ABDOMINAL WALL:  There is nonspecific stranding subcutaneous tissues left lower quadrant.  There are calcifications associated with this finding suggesting multiple injection granulomata.  Cellulitis would not be excluded. URINARY BLADDER:  No visible focal wall thickening, lesion, or calculus.  PELVIC NODES:  No adenopathy.  PELVIC ORGANS:  No visible mass.  Pelvic organs appropriate for patient age.  BONES:  There is irregularity of the amputated margin distal right tibia.  Correlation with clinical findings here is necessary to exclude infection or osteomyelitis. LUNG BASES:  There is dependent atelectasis in the left lower lobe. OTHER:  Negative.             CONCLUSION:  1. Severe stenosis in renal arteries bilaterally.  There is severe stenosis in both the  main and accessory right renal artery. 2. Occlusion RONY origin with reconstitution of distal branches via collateral flow from the SMA. 3. Prior right lower extremity below-knee amputation.  There is stenosis of the common femoral artery and severe multifocal stenosis or occlusion of the superficial femoral artery.  There is irregular ossification at the distal amputated margin of the tibia with surrounding stranding and edema.  This could represent soft tissue infection and osteomyelitis in the proper clinical setting. 4. Prior left leg below-knee amputation.  There is severe stenosis and occlusion of the majority of the length of the SFA.  There is collateral flow through profunda femoral artery branches. 5. Runoff vessels below the knee are obscured in part by prior amputation but also by extensive calcification and suboptimal timing of the vessels. 6. There is cholelithiasis.  There is also gallbladder wall thickening and cholecystitis would not be excluded. 7. Other incidental findings are described above.  Preliminary report was reviewed and there is no significant discrepancy.     LOCATION:  [facility]   Dictated by (CST): Irving Nelson MD on 4/08/2025 at 8:14 AM     Finalized by (CST): Irving Nelson MD on 4/08/2025 at 8:28 AM       MRI LOWER LEG, RIGHT (CPT=73718)    Result Date: 4/7/2025  PROCEDURE:  MRI LOWER LEG, RIGHT (CPT=73718)  COMPARISON:  [facility], CT LOWER EXT RIGHT (W IV)(CPT=73701), 4/05/2025, 3:15 PM.  INDICATIONS:  patient with R BKA with recent stump infection admitted with fever c/f osteomyelitis on CT  TECHNIQUE:  A variety of imaging planes and parameters were utilized for visualization of suspected pathology.  Images were performed without contrast.  PATIENT STATED HISTORY: (As transcribed by Technologist)  Right lower leg osteo    FINDINGS:  BONES:  There is mild marrow edema noted at the distal amputated margin of the tibia.  This finding is nonspecific and could be reactive related to  the prior surgery although in the setting of suspected infection this could represent early osteomyelitis. SOFT TISSUES:  Circumferential ill-defined fluid signal is noted superficially throughout the distal thigh and proximal foreleg.  At and distal to the knee there is also edema within muscles and in the intermuscular fascial planes.            CONCLUSION:  1. Focal marrow edema distal amputated margin of tibia could represent early osteomyelitis in the proper clinical setting. 2. Circumferential superficial edema around leg and edema within muscles and intermuscular fascial planes could represent cellulitis and myositis/fasciitis.  3. There is no drainable abscess cavity detected.   LOCATION:  [facility]   Dictated by (CST): Irving Nelson MD on 4/07/2025 at 1:00 PM     Finalized by (CST): Irving Nelson MD on 4/07/2025 at 1:04 PM       CT LOWER EXT RIGHT (W IV)(CPT=73701)    Result Date: 4/5/2025  PROCEDURE:  CT LOWER EXT RIGHT (W IV) EM (CPT=73701)  COMPARISON:  None.  INDICATIONS:  Abscess is  TECHNIQUE:  Multi-planar CT images were created with non-ionic intravenous contrast material.  Dose reduction techniques were used. Dose information is transmitted to the ACR (American College of Radiology) NRDR (National Radiology Data Registry) which includes the Dose Index Registry.   PATIENT STATED HISTORY:(As transcribed by Technologist)  Patient presents for evaluation of possible abscess involving stump.   CONTRAST USED:  100cc of Isovue 370  FINDINGS:  Evaluation limited secondary to patient body habitus.  No acute fracture identified.  Postoperative changes with below the knee and dictation.  Osteoarthritic changes within the hip and knee.  Femoral acetabular relationship is within normal limits.  There is possible subtle cortical irregularity of the distal tibial stump osteomyelitis cannot be excluded.  Soft tissue edema is noted, most prominent within the stump..  There is a suprapatellar joint effusion .  No  definite walled-off fluid collection is noted .  There is a rounded area of edema noted just distal to the tibial stump and developing abscess cannot be completely excluded.  Vascular calcifications.  There is a small popliteal cyst.             CONCLUSION:  1. Considerable soft tissue edema, most prominent within the stump.  No walled-off fluid collection is noted to suggest abscess but there is rounded area of edema noted just distal to the tibial stump and developing abscess cannot be excluded. 2. No acute fractures.  There is possible subtle cortical irregularity of the distal tibial stump and osteomyelitis cannot be completely excluded.  If further evaluation is needed, consider MRI. 3. Suprapatellar joint effusion. 4. Osteoarthritic changes within the knee and hip.    LOCATION:  [facility]   Dictated by (CST): Deon Guevara MD on 4/05/2025 at 3:51 PM     Finalized by (CST): Deon Guevara MD on 4/05/2025 at 3:56 PM       CT ABDOMEN+PELVIS(CPT=74176)    Result Date: 4/4/2025  PROCEDURE:  CT ABDOMEN+PELVIS (CPT=74176)  COMPARISON:  [facility], CT, CT ABDOMEN+PELVIS KIDNEYSTONE 2D RNDR(NO IV,NO ORAL)(CPT=74176), 1/26/2016, 9:23 AM.  INDICATIONS:  snf called for fever,n/v/pain  TECHNIQUE:  Unenhanced multislice CT scanning was performed from the dome of the diaphragm to the pubic symphysis.  Dose reduction techniques were used. Dose information is transmitted to the ACR (American College of Radiology) NRDR (National Radiology Data Registry) which includes the Dose Index Registry.  PATIENT STATED HISTORY: (As transcribed by Technologist)  Nausea, vomiting, abdominal pain.    FINDINGS:  LIVER:  No enlargement, atrophy, abnormal density, or significant focal lesion.  BILIARY:  Cholelithiasis. PANCREAS:  No lesion, fluid collection, ductal dilatation, or atrophy.  SPLEEN:  No enlargement or focal lesion.  KIDNEYS:  No hydronephrosis or obstructive nephrolithiasis.  2.8 cm right renal cyst.  Nonspecific bilateral  perinephric fat stranding. ADRENALS:  Grossly stable 3.8 cm left adrenal lesion.  Right adrenal gland is within normal limits. AORTA/VASCULAR:    Atherosclerotic disease without aneurysm. RETROPERITONEUM:  No mass or adenopathy.  BOWEL/MESENTERY:  No dilated bowel or wall thickening.  Appendix not visualized.  Colonic diverticulosis, without diverticulitis. ABDOMINAL WALL:  Left lower quadrant abdominal wall fat stranding and calcification. URINARY BLADDER:  No visible focal wall thickening, lesion, or calculus.  PELVIC NODES:  No adenopathy.  PELVIC ORGANS:  No visible mass.  Pelvic organs appropriate for patient age.  BONES:  Degenerative changes of the spine. LUNG BASES:  No visible pulmonary or pleural disease.  OTHER:  Negative.             CONCLUSION:  1. No acute abnormality in the abdomen or pelvis. 2. Left lower quadrant abdominal wall fat stranding and calcification, correlate clinically for possible cellulitis. 3. Cholelithiasis. 4. Colonic diverticulosis. 5. Please see above for further details.   LOCATION:  [facility]   Dictated by (CST): Jackson Monaco MD on 4/04/2025 at 11:38 PM     Finalized by (CST): Jackson Monaco MD on 4/04/2025 at 11:42 PM            ASSESSMENT AND PLAN:     The patient is a 69 year old female who presents with right BKA incision site infection. Currently on IV vancomycin and cefepime. Patient has been dressing wound with wet to dry dressing. Patient had a CTA with runoff on 4/8. Imaging indicates stenosis of the right common femoral artery and SFA. Patient to discuss possible treatment options with Dr. Marino today.    The patient indicated an understanding of these issues and agreed to the plan and all questions were answered.     RUBA Mcgee  04/08/25   8:53 AM   Supplementary Documentation:   DVT Mechanical Prophylaxis:   SCDs,    DVT Pharmacologic Prophylaxis   Medication    heparin (Porcine) 5000 UNIT/ML injection 5,000 Units                Code Status: Full  Code  Painter: No urinary catheter in place  Painter Duration (in days):   Central line:    REAGAN:                                         [1]   No current facility-administered medications on file prior to encounter.     Current Outpatient Medications on File Prior to Encounter   Medication Sig Dispense Refill    amLODIPine 5 MG Oral Tab Take 2 tablets (10 mg total) by mouth daily.      carvedilol 25 MG Oral Tab Take 1 tablet (25 mg total) by mouth 2 (two) times daily with meals.      diphenhydrAMINE 25 MG Oral Cap Take 1 capsule (25 mg total) by mouth every 6 (six) hours as needed for Itching. Allergy symptoms      gabapentin 100 MG Oral Cap Take 2 capsules (200 mg total) by mouth nightly.      hydrALAZINE 25 MG Oral Tab Take 1 tablet (25 mg total) by mouth 3 (three) times daily.      lidocaine-prilocaine 2.5-2.5 % External Cream Apply topically as needed.      loratadine 10 MG Oral Tablet Dispersible Take 1 tablet (10 mg total) by mouth daily. Every other day      nitrofurantoin monohydrate macro 100 MG Oral Cap Take 1 capsule (100 mg total) by mouth 2 (two) times daily. For 3 days.started on 4/3/25      nystatin 100,000 Units/g External Cream Apply topically 2 (two) times daily.      omeprazole 20 MG Oral Capsule Delayed Release Take 2 capsules (40 mg total) by mouth every morning.      ondansetron 4 MG Oral Tablet Dispersible Take 1 tablet (4 mg total) by mouth every 8 (eight) hours as needed for Nausea.      sodium chloride 1 g Oral Tab Take 1 tablet (1 g total) by mouth 3 (three) times daily with meals.      sennosides 8.6 MG Oral Tab Take 1 tablet (8.6 mg total) by mouth 2 (two) times daily. 2 tablet Q12H PRN      renal vitamin Oral Tab Take 1 tablet by mouth daily.      Polyethylene Glycol 3350 17 g Oral Powd Pack Take 17 g by mouth daily. Daily PRN      cinacalcet 30 MG Oral Tab Take 1 tablet (30 mg total) by mouth daily.      Sevelamer HCl 800 MG Oral Tab Take 2 tablets (1,600 mg total) by mouth in the morning,  at noon, and at bedtime. With meals      PROCRIT 72153 UNIT/ML Injection Solution Take 1 mL (20,000 Units total) by mouth every 14 (fourteen) days. [id number]/ml. 1ML s/c MWF given with dialysis      BuPROPion HCl ER, SR, 150 MG Oral Tablet 12 Hr Take 2 tablets (300 mg total) by mouth 2 (two) times daily.      Losartan Potassium 50 MG Oral Tab Take 2 tablets (100 mg total) by mouth daily.      allopurinol 100 MG Oral Tab Take 1 tablet (100 mg total) by mouth daily.      LEVEMIR FLEXTOUCH 100 UNIT/ML Subcutaneous Solution Pen-injector As Directed.      ergocalciferol 44648 units Oral Cap Take 1 capsule by mouth once a week. (Patient not taking: Reported on 4/5/2025)      glimepiride 2 MG Oral Tab Take by mouth daily with breakfast.   (Patient not taking: Reported on 4/5/2025)  0    CloNIDine HCl 0.3 MG Oral Tab Take 0.3 mg by mouth daily.   (Patient not taking: Reported on 4/5/2025)      aspirin (KP ASPIRIN) 81 MG Oral Tab EC Take 81 mg by mouth daily. (Patient not taking: Reported on 4/5/2025)      Cinnamon 500 MG Oral Cap Take 500 mg by mouth daily. 2 tablets every day (Patient not taking: Reported on 4/5/2025)      Lactobacillus (PROBIOTIC ACIDOPHILUS) Oral Cap Take 1 tablet by mouth 2 (two) times daily. (Patient not taking: Reported on 4/5/2025)      Oxybutynin Chloride (DITROPAN) 5 MG Oral Tab Take 5 mg by mouth 3 (three) times daily. (Patient not taking: Reported on 4/5/2025)      Furosemide (LASIX) 40 MG Oral Tab Take 80 mg by mouth 2 (two) times daily.   (Patient not taking: Reported on 4/5/2025)      Montelukast Sodium (SINGULAIR) 10 MG Oral Tab Take 10 mg by mouth nightly. (Patient not taking: Reported on 4/5/2025)

## 2025-04-08 NOTE — PLAN OF CARE
Patient A&Ox4. Wears glasses.  room air, 2L NC at night. Tele NS. Heparin subcutaneous for DVT prophylaxis. Renal diet. Incontinent x2. IV cefepime. IV vanco. VALENCIA BKA. Dressing to R stump changed; C/D/I. R chest port (unit draw). No further needs at this time, call light within reach.    Problem: Patient/Family Goals  Goal: Patient/Family Long Term Goal  Description: Patient's Long Term Goal:   Discharge back to facility  Interventions:-   Follow POC  - See additional Care Plan goals for specific interventions  Outcome: Progressing  Goal: Patient/Family Short Term Goal  Description: Patient's Short Term Goal: 4/4 NOC:Rest  4/5 NOC:rest  4/7 NOC: Sleep  Interventions: - cluster care  - See additional Care Plan goals for specific interventions  Outcome: Progressing

## 2025-04-09 LAB
ANION GAP SERPL CALC-SCNC: 11 MMOL/L (ref 0–18)
BUN BLD-MCNC: 58 MG/DL (ref 9–23)
CALCIUM BLD-MCNC: 9.6 MG/DL (ref 8.7–10.6)
CHLORIDE SERPL-SCNC: 99 MMOL/L (ref 98–112)
CO2 SERPL-SCNC: 25 MMOL/L (ref 21–32)
CREAT BLD-MCNC: 5.76 MG/DL (ref 0.55–1.02)
EGFRCR SERPLBLD CKD-EPI 2021: 8 ML/MIN/1.73M2 (ref 60–?)
GLUCOSE BLD-MCNC: 114 MG/DL (ref 70–99)
GLUCOSE BLD-MCNC: 129 MG/DL (ref 70–99)
GLUCOSE BLD-MCNC: 190 MG/DL (ref 70–99)
GLUCOSE BLD-MCNC: 293 MG/DL (ref 70–99)
GLUCOSE BLD-MCNC: 81 MG/DL (ref 70–99)
GLUCOSE BLD-MCNC: 94 MG/DL (ref 70–99)
GLUCOSE BLD-MCNC: >600 MG/DL (ref 70–99)
OSMOLALITY SERPL CALC.SUM OF ELEC: 296 MOSM/KG (ref 275–295)
POTASSIUM SERPL-SCNC: 4.7 MMOL/L (ref 3.5–5.1)
SODIUM SERPL-SCNC: 135 MMOL/L (ref 136–145)

## 2025-04-09 PROCEDURE — 90935 HEMODIALYSIS ONE EVALUATION: CPT | Performed by: INTERNAL MEDICINE

## 2025-04-09 PROCEDURE — 99232 SBSQ HOSP IP/OBS MODERATE 35: CPT | Performed by: STUDENT IN AN ORGANIZED HEALTH CARE EDUCATION/TRAINING PROGRAM

## 2025-04-09 NOTE — CM/SW NOTE
Informed that patient may need IV antibiotics at discharge. Unsure if she will be getting them at the [facility] or HD clinic. Spoke with Ema from [facility] to inquired if they are able to use perma cath for IV abx. She states \"we don't touch the permacath, she would need a picc line or a midline\".    Spoke with Belem from [facility]. Patient has chair time MWF 550am-935am. Informed her of possible need for IV abx at clinic. Will update them when plan known.     SW/MEERA to remain available for support and/or discharge planning.    Domi BRYANT LCSW  Discharge Planner

## 2025-04-09 NOTE — PLAN OF CARE
Pt is A&Ox4. Wears glasses. VSS, afebrile. SPO2 maintained on RA. 2L at [facility]. Tele, NSR/SB. Heparin. Last BM 4/8- MD aware, tap water enema was given per pt request. General/renal diet, QID accucheks. Anuric. Up TL/dex lift. C/o HA pain- tylenol given. PIV, IV ABX. Wounds c/d/i. No further needs at this time, continue POC. Safety precautions in place.    Problem: Patient/Family Goals  Goal: Patient/Family Long Term Goal  Description: Patient's Long Term Goal:   Discharge back to facility  Interventions:-   Follow POC  - See additional Care Plan goals for specific interventions  Outcome: Progressing  Goal: Patient/Family Short Term Goal  Description: Patient's Short Term Goal: 4/4 NOC:Rest  4/5 NOC:rest  4/7 NOC: Sleep  Interventions: - cluster care  - See additional Care Plan goals for specific interventions  Outcome: Progressing     Problem: PAIN - ADULT  Goal: Verbalizes/displays adequate comfort level or patient's stated pain goal  Description: INTERVENTIONS:- Encourage pt to monitor pain and request assistance- Assess pain using appropriate pain scale- Administer analgesics based on type and severity of pain and evaluate response- Implement non-pharmacological measures as appropriate and evaluate response- Consider cultural and social influences on pain and pain management- Manage/alleviate anxiety- Utilize distraction and/or relaxation techniques- Monitor for opioid side effects- Notify MD/LIP if interventions unsuccessful or patient reports new pain- Anticipate increased pain with activity and pre-medicate as appropriate  Outcome: Progressing     Problem: GASTROINTESTINAL - ADULT  Goal: Minimal or absence of nausea and vomiting  Description: INTERVENTIONS:- Maintain adequate hydration with IV or PO as ordered and tolerated- Nasogastric tube to low intermittent suction as ordered- Evaluate effectiveness of ordered antiemetic medications- Provide nonpharmacologic comfort measures as appropriate- Advance diet as  tolerated, if ordered- Obtain nutritional consult as needed- Evaluate fluid balance  Outcome: Progressing     Problem: SKIN/TISSUE INTEGRITY - ADULT  Goal: Incision(s), wounds(s) or drain site(s) healing without S/S of infection  Description: INTERVENTIONS:- Assess and document risk factors for pressure ulcer development- Assess and document skin integrity- Assess and document dressing/incision, wound bed, drain sites and surrounding tissue- Implement wound care per orders- Initiate isolation precautions as appropriate- Initiate Pressure Ulcer prevention bundle as indicated  Outcome: Progressing

## 2025-04-09 NOTE — PLAN OF CARE
4/9 a/ox4, on 2L/nc, blood sugar at lunch greater than 600, md notified. Orders made, [id number]=083, per sliding scale will have 10 units. Pt. Refused insulin. Md notified. To re check again. ; re checked at 3pm. Bs=81. ,pt. For HD today at 4pm. Emla applied by dialysis RN 1 hr. Prior HD.call light within reach.     Problem: Patient/Family Goals  Goal: Patient/Family Long Term Goal  Description: Patient's Long Term Goal:   Discharge back to facility  Interventions:-   Follow POC  - See additional Care Plan goals for specific interventions  Outcome: Progressing  Goal: Patient/Family Short Term Goal  Description: Patient's Short Term Goal: 4/4 NOC:Rest  4/5 NOC:rest  4/7 NOC: Sleep  Interventions: - cluster care  - See additional Care Plan goals for specific interventions  Outcome: Progressing     Problem: PAIN - ADULT  Goal: Verbalizes/displays adequate comfort level or patient's stated pain goal  Description: INTERVENTIONS:- Encourage pt to monitor pain and request assistance- Assess pain using appropriate pain scale- Administer analgesics based on type and severity of pain and evaluate response- Implement non-pharmacological measures as appropriate and evaluate response- Consider cultural and social influences on pain and pain management- Manage/alleviate anxiety- Utilize distraction and/or relaxation techniques- Monitor for opioid side effects- Notify MD/LIP if interventions unsuccessful or patient reports new pain- Anticipate increased pain with activity and pre-medicate as appropriate  Outcome: Progressing     Problem: GASTROINTESTINAL - ADULT  Goal: Minimal or absence of nausea and vomiting  Description: INTERVENTIONS:- Maintain adequate hydration with IV or PO as ordered and tolerated- Nasogastric tube to low intermittent suction as ordered- Evaluate effectiveness of ordered antiemetic medications- Provide nonpharmacologic comfort measures as appropriate- Advance diet as tolerated, if ordered- Obtain  nutritional consult as needed- Evaluate fluid balance  Outcome: Progressing     Problem: SKIN/TISSUE INTEGRITY - ADULT  Goal: Incision(s), wounds(s) or drain site(s) healing without S/S of infection  Description: INTERVENTIONS:- Assess and document risk factors for pressure ulcer development- Assess and document skin integrity- Assess and document dressing/incision, wound bed, drain sites and surrounding tissue- Implement wound care per orders- Initiate isolation precautions as appropriate- Initiate Pressure Ulcer prevention bundle as indicated  Outcome: Progressing

## 2025-04-09 NOTE — PROGRESS NOTES
INFECTIOUS DISEASE  PROGRESS NOTE            [facility]    Rosario Mcmullen Patient Status:  Inpatient    1955 MRN [id number]   Formerly [facility] 5NW-A Attending Merlene Duncan,    Hosp Day # 3 PCP Sal Montalvo MD     Antibiotics: Cefepime#4    Subjective:  resting    Objective:  Temp:  [98.1 °F (36.7 °C)-98.9 °F (37.2 °C)] 98.4 °F (36.9 °C)  Pulse:  [53-60] 54  Resp:  [18] 18  BP: (105-159)/(34-54) 132/46  SpO2:  [97 %-99 %] 99 %    General: awake alert  Vital signs:Temp:  [98.1 °F (36.7 °C)-98.9 °F (37.2 °C)] 98.4 °F (36.9 °C)  Pulse:  [53-60] 54  Resp:  [18] 18  BP: (105-159)/(34-54) 132/46  SpO2:  [97 %-99 %] 99 %  HEENT: Moist mucous membranes. Extraocular muscles are intact.  Neck: supple no masses  Port in place  Respiratory: Non labored, symmetric excursion, normal respirations  Cardiovascular: no irregularities in rhythm  Abdomen: Soft, nontender, nondistended.   Musculoskeletal: right BKA stump dressed    COVID-19 Lab Results    COVID-19  Lab Results   Component Value Date    COVID19 Not Detected 2025       Pro-Calcitonin  No results for input(s): \"PCT\" in the last 168 hours.    Cardiac  No results for input(s): \"TROP\", \"PBNP\" in the last 168 hours.    Creatinine Kinase  No results for input(s): \"CK\" in the last 168 hours.    Inflammatory Markers  Recent Labs   Lab 25  0637   CRP 11.60*       Recent Labs   Lab 25  0637 25  0636 25  0639   RBC 2.96*   < > 2.57*   HGB 9.6*   < > 8.6*   HCT 30.8*   < > 27.1*   .1*   < > 105.4*   MCH 32.4   < > 33.5   MCHC 31.2   < > 31.7   RDW 15.0   < > 15.0   NEPRELIM 8.19*  --   --    WBC 9.5   < > 10.3   .0   < > 199.0    < > = values in this interval not displayed.         Recent Labs   Lab 25  2148 25  0637 25  0636 25  0648 25  0639 25  0506   *   < > 95 98 108* 94   BUN 29*   < > 43* 36* 43* 58*   CREATSERUM 3.57*   < > 5.01* 3.90* 4.70*  5.76*   CA 10.5   < > 10.0 9.8 9.6 9.6   ALB 4.0  --  3.5  --   --   --       < > 135* 136 134* 135*   K 5.1   < > 5.4* 4.3 4.9 4.7   CL 99   < > 99 98 98 99   CO2 29.0   < > 28.0 29.0 27.0 25.0   ALKPHO 71  --  62  --   --   --    AST 15  --  14  --   --   --    ALT 13  --  16  --   --   --    BILT <0.2*  --  <0.2*  --   --   --    TP 6.9  --  6.1  --   --   --     < > = values in this interval not displayed.       No results found for: \"VANCT\"  Microbiology    Hospital Encounter on 04/04/25   1. Aerobic Bacterial Culture     Status: Abnormal (Preliminary result)    Collection Time: 04/07/25  2:34 PM    Specimen: Leg, right; Other   Result Value Ref Range    Aerobic Culture Result 2+ growth Pseudomonas aeruginosa (A) N/A    Aerobic Smear No WBCs seen N/A    Aerobic Smear No organisms seen N/A   2. Blood Culture     Status: None (Preliminary result)    Collection Time: 04/05/25  7:50 AM    Specimen: Blood,peripheral   Result Value Ref Range    Blood Culture Result No Growth 3 Days N/A         Radiology    MRI: focal marrow edema, no abscess    Problem list reviewed:  Patient Active Problem List   Diagnosis    Shortness of breath    Chest pain on breathing    Benign essential HTN    Renal insufficiency    Type 2 diabetes mellitus with diabetic peripheral angiopathy without gangrene, without long-term current use of insulin (HCC)    Chronic diastolic (congestive) heart failure (HCC)    Morbid obesity (HCC)    Mixed hyperlipidemia    Bronchitis    Bronchospasm    Chest pain, atypical    Elevated d-dimer    Left foot pain    Type 2 diabetes mellitus with hyperglycemia, unspecified whether long term insulin use (HCC)    Anemia in ESRD (end-stage renal disease) (HCC)    Stage 4 chronic kidney disease (HCC)    ESRD (end stage renal disease) (HCC)    HTN (hypertension), benign    Preop cardiovascular exam    Cellulitis of right lower extremity    Abdominal pain of unknown etiology    ESRD on hemodialysis (HCC)     Hypertension    Hyperkalemia             ASSESSMENT/PLAN:  1. Right BKA stump wound  Non healing, was on wound vac prior to admission  -slough and green discharge superficially  -Pseudomonas isolated from preliminary culture      2. ESRD    Awaiting S for PSA  IVABX on discharge  Via port a cath or during dialysis, depending on antibiotic and its frequency        Griffin Ragsdale MD, MD [facility]  [phone]

## 2025-04-09 NOTE — PROGRESS NOTES
Pt. Requesting to take 3l out in dialysis, order is 2l. Dr. Estes notified. Ok'd to take 3l out if tolerated. Dialysis made aware.

## 2025-04-09 NOTE — PROGRESS NOTES
[facility]   part of [facility]     Hospitalist Progress Note     Rosario JARAMILLO Reji Mcmullen Patient Status:  Inpatient    1955 MRN [id number]   Location [facility] 5NW-A Attending Usman Paz,    Hosp Day # 3 PCP Sal Montalvo MD     Chief Complaint: Fever    Subjective:     Patient resting in bed with no new complaints    Objective:    Review of Systems:   A comprehensive review of systems was completed; pertinent positive and negatives stated in subjective.    Vital signs:  Temp:  [98 °F (36.7 °C)-98.9 °F (37.2 °C)] 98 °F (36.7 °C)  Pulse:  [54-60] 60  Resp:  [17-18] 17  BP: (132-159)/(34-54) 139/38  SpO2:  [97 %-99 %] 98 %    Physical Exam:    General: No acute distress  Respiratory: No wheezes, no rhonchi  Cardiovascular: S1, S2, regular rate and rhythm  Abdomen: Soft, Non-tender, non-distended, positive bowel sounds  Neuro: No new focal deficits.   Extremities: Right BKA with dressing    Diagnostic Data:    Labs:  Recent Labs   Lab 256 25  0637 25  0636 25  0639   WBC 13.0* 9.5 7.4 10.3   HGB 10.6* 9.6* 8.6* 8.6*   .6* 104.1* 105.6* 105.4*   .0 200.0 174.0 199.0       Recent Labs   Lab 25  2148 25  0637 25  0636 25  0648 25  0639 25  0506   *   < > 95 98 108* 94   BUN 29*   < > 43* 36* 43* 58*   CREATSERUM 3.57*   < > 5.01* 3.90* 4.70* 5.76*   CA 10.5   < > 10.0 9.8 9.6 9.6   ALB 4.0  --  3.5  --   --   --       < > 135* 136 134* 135*   K 5.1   < > 5.4* 4.3 4.9 4.7   CL 99   < > 99 98 98 99   CO2 29.0   < > 28.0 29.0 27.0 25.0   ALKPHO 71  --  62  --   --   --    AST 15  --  14  --   --   --    ALT 13  --  16  --   --   --    BILT <0.2*  --  <0.2*  --   --   --    TP 6.9  --  6.1  --   --   --     < > = values in this interval not displayed.       Estimated Glomerular Filtration Rate: 8 mL/min/1.73m2 (A) (result from lab).    No results for input(s): \"TROP\", \"TROPHS\", \"CK\" in the last 168  hours.    No results for input(s): \"PTP\", \"INR\" in the last 168 hours.               Microbiology    Hospital Encounter on 04/04/25   1. Aerobic Bacterial Culture     Status: Abnormal (Preliminary result)    Collection Time: 04/07/25  2:34 PM    Specimen: Leg, right; Other   Result Value Ref Range    Aerobic Culture Result 2+ growth Pseudomonas aeruginosa (A) N/A    Aerobic Smear No WBCs seen N/A    Aerobic Smear No organisms seen N/A       Susceptibility    Pseudomonas aeruginosa -  (no method available)     Cefepime >16 Resistant      Ceftazidime >16 Resistant      Ciprofloxacin 2 Intermediate      Meropenem* >8 Resistant       * Multiple drug resistant organism (MDRO)     Levofloxacin 4 Intermediate      Piperacillin + Tazobactam* 64 Sensitive       * The current CLSI piperacillin-tazobactam susceptibility breakpoint is an CHAPO of 16 mcg/mL. MICs above this should NOT be considered susceptible. Updated susceptibility reporting is in progress.     Tobramycin <=1 Sensitive    2. Blood Culture     Status: None (Preliminary result)    Collection Time: 04/05/25  7:50 AM    Specimen: Blood,peripheral   Result Value Ref Range    Blood Culture Result No Growth 4 Days N/A         Imaging: Reviewed in Epic.    Medications: Scheduled Medications[1]    Assessment & Plan:      #Suspected Right stump Cellulitis c/f possible osteomyelitis   #Sepsis 2/2 above   Patient presented with abdominal pain. COVID, Flu, RSV negative  -CT showing LLQ abdominal wall fat stranding and calcification (on exam no skin erythema here to support abdominal wall cellulitis)  -CT RLE with considerable soft tissue edema, most prominent within the stump, no walled-off fluid collection. Possible subtle cortical irregularity of the distal tibial stump, possible osteomyelitis   -MR with focal marrow edema distal amputated margin of tibia early osteomyelitis, circumferential superficial edema around leg and edema within muscles and intermuscular fascial  planes could represent cellulitis/myositis/fasciitis  -Follow cultures  -IV antibiotics  -ID following  -Wound care following     #Peripheral vascular disease  -CTA in 01/2025 with aortoiliac inflow to both lower extremities patient. Right lower with severe stenosis at origin of the profunda femoris and diffuse disease of the femoral-popliteal arteries resulting in moderate to severe diffuse stenosis. -Based on prior vascular surgery note at OSH in 01/2025, revascularization, RAKA were offered and patient decided to continue wound care and follow up with Dr. Maguire (Vascular surgeon in [place])  -CTA done here and vascular offered angiogram and profunda angioplasty with shockwave lithotripsy followed by [facility]  -Patient is not interested in procedures here and has a follow up with Dr. Maguire on 4/22/2025.     #ESRD  -Sevelamer, Cinacalcet  -Nephrology following    #DM type II  -Hyperglycemia protocol     #HTN  -Carvedilol, losartan  -Amlodipine and hydralazine discontinued     #HFpEF  -Carvedilol     #Chronic pain  -Gabapentin     #GERD  -PPI     #Mood disorder  -Bupropion     #Gout  Allopurinol    #Microcytic anemia  -Baseline hemoglobin 9.1    Discharge planning; plan for IV antibiotics at discharge pending cultures      Usman Paz DO    Supplementary Documentation:     Quality:  DVT Mechanical Prophylaxis:   SCDs,    DVT Pharmacologic Prophylaxis   Medication    heparin (Porcine) 5000 UNIT/ML injection 5,000 Units                Code Status: Full Code  Painter: No urinary catheter in place  Painter Duration (in days):   Central line:    REAGAN: 4/9/2025    Discharge is dependent on: Clinical improvement  At this point Ms. Reji Mcmullen is expected to be discharge to: TBD    The 21st Century Cures Act makes medical notes like these available to patients in the interest of transparency. Please be advised this is a medical document. Medical documents are intended to carry relevant information, facts as evident, and the  clinical opinion of the practitioner. The medical note is intended as peer to peer communication and may appear blunt or direct. It is written in medical language and may contain abbreviations or verbiage that are unfamiliar.              **Certification      PHYSICIAN Certification of Need for Inpatient Hospitalization - Initial Certification    Patient will require inpatient services that will reasonably be expected to span two midnight's based on the clinical documentation in H+P.   Based on patients current state of illness, I anticipate that, after discharge, patient will require TBD.           [1]    tobramycin  3 mg/kg (Adjusted) Intravenous Once    piperacillin-tazobactam  4.5 g Intravenous Q12H    insulin aspart  1-68 Units Subcutaneous TID CC    insulin aspart  1-30 Units Subcutaneous TID AC and HS    multivitamin  1 tablet Oral Daily    heparin  5,000 Units Subcutaneous 2 times per day    sennosides  8.6 mg Oral BID    sodium chloride  1 g Oral TID CC    allopurinol  100 mg Oral Daily    buPROPion SR  300 mg Oral BID    carvedilol  25 mg Oral BID with meals    cinacalcet  30 mg Oral Daily    gabapentin  200 mg Oral Nightly    nystatin   Topical BID    pantoprazole  40 mg Oral QAM AC    sevelamer carbonate  1,600 mg Oral TID CC    ondansetron  4 mg Intravenous Once

## 2025-04-09 NOTE — PROGRESS NOTES
[facility]  Inpatient Wound Care Contact Note    Rosario JARAMILLO Robelgaritika Ruizbhanu Patient Status:  Inpatient    1955 MRN [id number]   Location [facility] 5NW-A Attending Usman Paz, DO   Hosp Day # 3 PCP Sal Montalvo MD     Author of this note spoke to MARLA Mclaughlin and notified her that NPWT is not appropriate at this time and will not be applied. RN will cancel this consult.     We will continue to follow this patient while in-house and assist with wound care discharge planning.    Please call [phone] if any questions.      Thank you,    Moon Drew, JOANA   [facility]  [address] [address] [phone]

## 2025-04-09 NOTE — PROGRESS NOTES
[facility]   part of [facility]     Nephrology Progress Note    Rosario ELLA Gandhililiya Patient Status:  Inpatient    1955 MRN [id number]   [facility] 5NW-A Attending Merlene Duncan, DO   Hosp Day # 3 PCP Sal Montalvo MD       SUBJECTIVE:  Denies complaints this morning    Current Facility-Administered Medications   Medication Dose Route Frequency    metoclopramide (Reglan) 5 mg/mL injection 5 mg  5 mg Intravenous Daily PRN    multivitamin (Dialyvite - Renal) tab 1 tablet  1 tablet Oral Daily    ceFEPIme (Maxipime) 1 g in sodium chloride 0.9% 100 mL IVPB-MBP  1 g Intravenous Q24H    glucose (Dex4) 15 GM/59ML oral liquid 15 g  15 g Oral Q15 Min PRN    Or    glucose (Glutose) 40% oral gel 15 g  15 g Oral Q15 Min PRN    Or    glucose-vitamin C (Dex-4) chewable tab 4 tablet  4 tablet Oral Q15 Min PRN    Or    dextrose 50% injection 50 mL  50 mL Intravenous Q15 Min PRN    Or    glucose (Dex4) 15 GM/59ML oral liquid 30 g  30 g Oral Q15 Min PRN    Or    glucose (Glutose) 40% oral gel 30 g  30 g Oral Q15 Min PRN    Or    glucose-vitamin C (Dex-4) chewable tab 8 tablet  8 tablet Oral Q15 Min PRN    heparin (Porcine) 5000 UNIT/ML injection 5,000 Units  5,000 Units Subcutaneous 2 times per day    acetaminophen (Tylenol Extra Strength) tab 500 mg  500 mg Oral Q4H PRN    melatonin tab 3 mg  3 mg Oral Nightly PRN    ondansetron (Zofran) 4 MG/2ML injection 4 mg  4 mg Intravenous Q6H PRN    polyethylene glycol (PEG 3350) (Miralax) 17 g oral packet 17 g  17 g Oral Daily PRN    bisacodyl (Dulcolax) 10 MG rectal suppository 10 mg  10 mg Rectal Daily PRN    insulin aspart (NovoLOG) 100 Units/mL FlexPen 2-10 Units  2-10 Units Subcutaneous TID AC and HS    benzonatate (Tessalon) cap 200 mg  200 mg Oral TID PRN    glycerin-hypromellose- (Artificial Tears) 0.2-0.2-1 % ophthalmic solution 1 drop  1 drop Both Eyes QID PRN    sodium chloride (Saline Mist) 0.65 % nasal solution 1 spray  1 spray  Each Nare Q3H PRN    sennosides (Senokot) tab 8.6 mg  8.6 mg Oral BID    sodium chloride tab 1 g  1 g Oral TID CC    allopurinol (Zyloprim) tab 100 mg  100 mg Oral Daily    buPROPion SR (WELLBUTRIN SR) 12 hr tab 300 mg  300 mg Oral BID    carvedilol (Coreg) tab 25 mg  25 mg Oral BID with meals    cinacalcet (Sensipar) tab 30 mg  30 mg Oral Daily    diphenhydrAMINE (Benadryl) cap/tab 25 mg  25 mg Oral Q6H PRN    gabapentin (Neurontin) cap 200 mg  200 mg Oral Nightly    nystatin (Mycostatin) 100,000 Units/g cream   Topical BID    pantoprazole (Protonix) DR tab 40 mg  40 mg Oral QAM AC    sevelamer carbonate (Renvela) tab 1,600 mg  1,600 mg Oral TID CC    lidocaine-prilocaine (Emla) 2.5-2.5 % cream   Topical PRN    ondansetron (Zofran) 4 MG/2ML injection 4 mg  4 mg Intravenous Once           Physical Exam:   /46 (BP Location: Right arm)   Pulse 54   Temp 98.4 °F (36.9 °C) (Oral)   Resp 18   Ht 5' 8\" (1.727 m)   Wt 230 lb (104.3 kg)   SpO2 99%   BMI 34.97 kg/m²   Temp (24hrs), Av.4 °F (36.9 °C), Min:98.1 °F (36.7 °C), Max:98.9 °F (37.2 °C)     No intake or output data in the 24 hours ending 25 0906    Last 3 Weights   25 0633 230 lb (104.3 kg)   25 0121 227 lb (103 kg)   25 0109 227 lb 11.8 oz (103.3 kg)   25 2040 149 lb 14.6 oz (68 kg)   21 1515 291 lb 0.1 oz (132 kg)   19 1118 295 lb 6.7 oz (134 kg)   18 1259 300 lb (136.1 kg)   18 1502 300 lb (136.1 kg)     General: Alert and oriented in no apparent distress.  HEENT: No scleral icterus, MMM  Neck: Supple, no BERTHA or thyromegaly  Cardiac: Regular rate and rhythm, S1, S2 normal, no murmur or rub  Lungs: Clear without wheezes, rales, rhonchi.    Abdomen: Soft, non-tender. + bowel sounds, no palpable organomegaly  Extremities: Without clubbing, cyanosis or edema. R BKA noted, left AV fistula with bruit and thrill  Neurologic: Alert and oriented, cranial nerves grossly intact, moving all  extremities  Skin: Warm and dry, no rash    Recent Labs     04/08/25  0639   WBC 10.3   HGB 8.6*   .4*   .0       Recent Labs     04/07/25  0648 04/08/25  0639 04/09/25  0506    134* 135*   K 4.3 4.9 4.7   CL 98 98 99   CO2 29.0 27.0 25.0   BUN 36* 43* 58*   CREATSERUM 3.90* 4.70* 5.76*   CA 9.8 9.6 9.6   MG 1.9  --   --        No results for input(s): \"ALT\", \"AST\", \"ALB\", \"AMYLASE\", \"LIPASE\", \"LDH\" in the last 72 hours.    Invalid input(s): \"ALPHOS\", \"TBIL\", \"DBIL\", \"TPROT\"      Impression/Plan:      ESRD - on HD ; MWF  - HD today  PAD/S/p BKA; suspect stump infection- recent tx @ OSH  - w/u ongoing   - would care; vascular following   - abx  Anemia due to CKD/chronic disease  ANITA w/ HD  DM  HTN  PAD  Hx of CHF; stable         Questions/concerns were discussed with patient and/or family by bedside.    Rudolph Reyes  4/9/2025

## 2025-04-10 LAB
GLUCOSE BLD-MCNC: 128 MG/DL (ref 70–99)
GLUCOSE BLD-MCNC: 175 MG/DL (ref 70–99)
GLUCOSE BLD-MCNC: 179 MG/DL (ref 70–99)
GLUCOSE BLD-MCNC: 85 MG/DL (ref 70–99)

## 2025-04-10 PROCEDURE — 99232 SBSQ HOSP IP/OBS MODERATE 35: CPT | Performed by: STUDENT IN AN ORGANIZED HEALTH CARE EDUCATION/TRAINING PROGRAM

## 2025-04-10 PROCEDURE — 99232 SBSQ HOSP IP/OBS MODERATE 35: CPT | Performed by: INTERNAL MEDICINE

## 2025-04-10 RX ORDER — TAZOBACTAM SODIUM AND PIPERACILLIN SODIUM 500; 4 MG/100ML; G/100ML
4.5 INJECTION, SOLUTION INTRAVENOUS EVERY 12 HOURS
Qty: 2400 ML | Refills: 0 | Status: SHIPPED | OUTPATIENT
Start: 2025-04-11 | End: 2025-04-23

## 2025-04-10 RX ORDER — METOCLOPRAMIDE HYDROCHLORIDE 5 MG/ML
10 INJECTION INTRAMUSCULAR; INTRAVENOUS DAILY PRN
Status: DISCONTINUED | OUTPATIENT
Start: 2025-04-10 | End: 2025-04-11

## 2025-04-10 NOTE — PROGRESS NOTES
[facility]   part of [facility]     Hospitalist Progress Note     Rosario JARAMILLO Reji Mcmullen Patient Status:  Inpatient    1955 MRN [id number]   Location [facility] 5NW-A Attending Usman Paz, DO   Hosp Day # 4 PCP Sal Montalvo MD     Chief Complaint: Fever    Subjective:     Patient resting in bed with no new complaints    Objective:    Review of Systems:   A comprehensive review of systems was completed; pertinent positive and negatives stated in subjective.    Vital signs:  Temp:  [98.4 °F (36.9 °C)-98.9 °F (37.2 °C)] 98.7 °F (37.1 °C)  Pulse:  [52-63] 63  Resp:  [16-18] 16  BP: (118-158)/(30-56) 126/37  SpO2:  [95 %-100 %] 96 %    Physical Exam:    General: No acute distress  Respiratory: No wheezes, no rhonchi  Cardiovascular: S1, S2, regular rate and rhythm  Abdomen: Soft, Non-tender, non-distended, positive bowel sounds  Neuro: No new focal deficits.   Extremities: Right BKA with dressing    Diagnostic Data:    Labs:  Recent Labs   Lab 25  0637 25  0636 25  0639   WBC 13.0* 9.5 7.4 10.3   HGB 10.6* 9.6* 8.6* 8.6*   .6* 104.1* 105.6* 105.4*   .0 200.0 174.0 199.0       Recent Labs   Lab 25  2148 25  0637 25  0636 25  0648 25  0639 25  0506   *   < > 95 98 108* 94   BUN 29*   < > 43* 36* 43* 58*   CREATSERUM 3.57*   < > 5.01* 3.90* 4.70* 5.76*   CA 10.5   < > 10.0 9.8 9.6 9.6   ALB 4.0  --  3.5  --   --   --       < > 135* 136 134* 135*   K 5.1   < > 5.4* 4.3 4.9 4.7   CL 99   < > 99 98 98 99   CO2 29.0   < > 28.0 29.0 27.0 25.0   ALKPHO 71  --  62  --   --   --    AST 15  --  14  --   --   --    ALT 13  --  16  --   --   --    BILT <0.2*  --  <0.2*  --   --   --    TP 6.9  --  6.1  --   --   --     < > = values in this interval not displayed.       Estimated Glomerular Filtration Rate: 8 mL/min/1.73m2 (A) (result from lab).    No results for input(s): \"TROP\", \"TROPHS\", \"CK\" in the last  168 hours.    No results for input(s): \"PTP\", \"INR\" in the last 168 hours.               Microbiology    Hospital Encounter on 04/04/25   1. Aerobic Bacterial Culture     Status: Abnormal    Collection Time: 04/07/25  2:34 PM    Specimen: Leg, right; Other   Result Value Ref Range    Aerobic Culture Result 2+ growth Pseudomonas aeruginosa (A) N/A    Aerobic Smear No WBCs seen N/A    Aerobic Smear No organisms seen N/A       Susceptibility    Pseudomonas aeruginosa -  (no method available)     Cefepime >16 Resistant      Ceftazidime >16 Resistant      Ciprofloxacin 2 Intermediate      Meropenem* >8 Resistant       * Multiple drug resistant organism (MDRO)     Levofloxacin 4 Intermediate      Piperacillin + Tazobactam* 64 Sensitive       * The current CLSI piperacillin-tazobactam susceptibility breakpoint is an CHAPO of 16 mcg/mL. MICs above this should NOT be considered susceptible. Updated susceptibility reporting is in progress.     Tobramycin <=1 Sensitive      Colistin <=2 Sensitive      Tigecycline* >4        * This antibiotic result is an CHAPO. No interpretation is currently available.     Ceftolozane/tazobactam  Resistant      Ceftazidime/avibactam  Resistant    2. Blood Culture     Status: None    Collection Time: 04/05/25  7:50 AM    Specimen: Blood,peripheral   Result Value Ref Range    Blood Culture Result No Growth 5 Days N/A         Imaging: Reviewed in Epic.    Medications: Scheduled Medications[1]    Assessment & Plan:      #Suspected Right stump Cellulitis c/f possible osteomyelitis   #Sepsis 2/2 above   Patient presented with abdominal pain. COVID, Flu, RSV negative  -CT showing LLQ abdominal wall fat stranding and calcification (on exam no skin erythema here to support abdominal wall cellulitis)  -CT RLE with considerable soft tissue edema, most prominent within the stump, no walled-off fluid collection. Possible subtle cortical irregularity of the distal tibial stump, possible osteomyelitis   -MR with  focal marrow edema distal amputated margin of tibia early osteomyelitis, circumferential superficial edema around leg and edema within muscles and intermuscular fascial planes could represent cellulitis/myositis/fasciitis  -Follow cultures  -IV antibiotics  -ID following  -Wound care following     #Peripheral vascular disease  -CTA in 01/2025 with aortoiliac inflow to both lower extremities patient. Right lower with severe stenosis at origin of the profunda femoris and diffuse disease of the femoral-popliteal arteries resulting in moderate to severe diffuse stenosis. -Based on prior vascular surgery note at OSH in 01/2025, revascularization, RAKA were offered and patient decided to continue wound care and follow up with Dr. Maguire (Vascular surgeon in [place])  -CTA done here and vascular offered angiogram and profunda angioplasty with shockwave lithotripsy followed by [facility]  -Patient is not interested in procedures here and has a follow up with Dr. Maguire on 4/22/2025.     #ESRD  -Sevelamer, Cinacalcet  -Nephrology following    #DM type II  -Hyperglycemia protocol     #HTN  -Carvedilol, losartan  -Amlodipine and hydralazine discontinued     #HFpEF  -Carvedilol     #Chronic pain  -Gabapentin     #GERD  -PPI     #Mood disorder  -Bupropion     #Gout  Allopurinol    #Microcytic anemia  -Baseline hemoglobin 9.1    Discharge planning; plan for IV antibiotics at discharge pending cultures. Patient should be able to receive IV antibiotics through her port      Usman Paz,     Supplementary Documentation:     Quality:  DVT Mechanical Prophylaxis:   SCDs,    DVT Pharmacologic Prophylaxis   Medication    heparin (Porcine) 5000 UNIT/ML injection 5,000 Units                Code Status: Full Code  Painter: No urinary catheter in place  Painter Duration (in days):   Central line:    REAGAN: 4/10/2025    Discharge is dependent on: Clinical improvement  At this point Ms. Reji Mcmullen is expected to be discharge to: TBD    The 21st  Century Cures Act makes medical notes like these available to patients in the interest of transparency. Please be advised this is a medical document. Medical documents are intended to carry relevant information, facts as evident, and the clinical opinion of the practitioner. The medical note is intended as peer to peer communication and may appear blunt or direct. It is written in medical language and may contain abbreviations or verbiage that are unfamiliar.              **Certification      PHYSICIAN Certification of Need for Inpatient Hospitalization - Initial Certification    Patient will require inpatient services that will reasonably be expected to span two midnight's based on the clinical documentation in H+P.   Based on patients current state of illness, I anticipate that, after discharge, patient will require TBD.           [1]    [START ON 4/11/2025] epoetin jaylin  10,000 Units Intravenous Once    piperacillin-tazobactam  4.5 g Intravenous Q12H    insulin aspart  1-68 Units Subcutaneous TID CC    insulin aspart  1-30 Units Subcutaneous TID AC and HS    multivitamin  1 tablet Oral Daily    heparin  5,000 Units Subcutaneous 2 times per day    sennosides  8.6 mg Oral BID    sodium chloride  1 g Oral TID CC    allopurinol  100 mg Oral Daily    buPROPion SR  300 mg Oral BID    carvedilol  25 mg Oral BID with meals    cinacalcet  30 mg Oral Daily    gabapentin  200 mg Oral Nightly    nystatin   Topical BID    pantoprazole  40 mg Oral QAM AC    sevelamer carbonate  1,600 mg Oral TID CC    ondansetron  4 mg Intravenous Once

## 2025-04-10 NOTE — PLAN OF CARE
4/10 a/ox4, denies any pain at this time. On 2l/nc, sat 100%. Pt removes nasal cannula on and off. Iv antibiotic infusing via nora cath.pt. expressed desire to get discharge today. Awaiting for ID for antibiotic decission.    Problem: Patient/Family Goals  Goal: Patient/Family Long Term Goal  Description: Patient's Long Term Goal:   Discharge back to facility  Interventions:-   Follow POC  - See additional Care Plan goals for specific interventions  Outcome: Progressing  Goal: Patient/Family Short Term Goal  Description: Patient's Short Term Goal: 4/4 NOC:Rest  4/5 NOC:rest  4/7 NOC: Sleep  4/9 noc:rest, sleep    Interventions: - cluster care  - See additional Care Plan goals for specific interventions  Outcome: Progressing     Problem: PAIN - ADULT  Goal: Verbalizes/displays adequate comfort level or patient's stated pain goal  Description: INTERVENTIONS:- Encourage pt to monitor pain and request assistance- Assess pain using appropriate pain scale- Administer analgesics based on type and severity of pain and evaluate response- Implement non-pharmacological measures as appropriate and evaluate response- Consider cultural and social influences on pain and pain management- Manage/alleviate anxiety- Utilize distraction and/or relaxation techniques- Monitor for opioid side effects- Notify MD/LIP if interventions unsuccessful or patient reports new pain- Anticipate increased pain with activity and pre-medicate as appropriate  Outcome: Progressing     Problem: GASTROINTESTINAL - ADULT  Goal: Minimal or absence of nausea and vomiting  Description: INTERVENTIONS:- Maintain adequate hydration with IV or PO as ordered and tolerated- Nasogastric tube to low intermittent suction as ordered- Evaluate effectiveness of ordered antiemetic medications- Provide nonpharmacologic comfort measures as appropriate- Advance diet as tolerated, if ordered- Obtain nutritional consult as needed- Evaluate fluid balance  Outcome: Progressing      Problem: SKIN/TISSUE INTEGRITY - ADULT  Goal: Incision(s), wounds(s) or drain site(s) healing without S/S of infection  Description: INTERVENTIONS:- Assess and document risk factors for pressure ulcer development- Assess and document skin integrity- Assess and document dressing/incision, wound bed, drain sites and surrounding tissue- Implement wound care per orders- Initiate isolation precautions as appropriate- Initiate Pressure Ulcer prevention bundle as indicated  Outcome: Progressing

## 2025-04-10 NOTE — PROGRESS NOTES
[facility]   part of [facility]     Nephrology Progress Note    Rosario JARAMILLO Reji Mcmullen Patient Status:  Inpatient    1955 MRN [id number]   [facility] 5NW-A Attending Merlene Duncan, DO   Hosp Day # 4 PCP Sal Montalvo MD       SUBJECTIVE:  No acute events      Current Facility-Administered Medications   Medication Dose Route Frequency    metoclopramide (Reglan) 5 mg/mL injection 10 mg  10 mg Intravenous Daily PRN    piperacillin-tazobactam (Zosyn) 4.5 g in dextrose 5% 100 mL IVPB-ADDV  4.5 g Intravenous Q12H    insulin aspart (NovoLOG) 100 Units/mL FlexPen 1-68 Units  1-68 Units Subcutaneous TID CC    insulin aspart (NovoLOG) 100 Units/mL FlexPen 1-30 Units  1-30 Units Subcutaneous TID AC and HS    multivitamin (Dialyvite - Renal) tab 1 tablet  1 tablet Oral Daily    glucose (Dex4) 15 GM/59ML oral liquid 15 g  15 g Oral Q15 Min PRN    Or    glucose (Glutose) 40% oral gel 15 g  15 g Oral Q15 Min PRN    Or    glucose-vitamin C (Dex-4) chewable tab 4 tablet  4 tablet Oral Q15 Min PRN    Or    dextrose 50% injection 50 mL  50 mL Intravenous Q15 Min PRN    Or    glucose (Dex4) 15 GM/59ML oral liquid 30 g  30 g Oral Q15 Min PRN    Or    glucose (Glutose) 40% oral gel 30 g  30 g Oral Q15 Min PRN    Or    glucose-vitamin C (Dex-4) chewable tab 8 tablet  8 tablet Oral Q15 Min PRN    heparin (Porcine) 5000 UNIT/ML injection 5,000 Units  5,000 Units Subcutaneous 2 times per day    acetaminophen (Tylenol Extra Strength) tab 500 mg  500 mg Oral Q4H PRN    melatonin tab 3 mg  3 mg Oral Nightly PRN    ondansetron (Zofran) 4 MG/2ML injection 4 mg  4 mg Intravenous Q6H PRN    polyethylene glycol (PEG 3350) (Miralax) 17 g oral packet 17 g  17 g Oral Daily PRN    bisacodyl (Dulcolax) 10 MG rectal suppository 10 mg  10 mg Rectal Daily PRN    benzonatate (Tessalon) cap 200 mg  200 mg Oral TID PRN    glycerin-hypromellose- (Artificial Tears) 0.2-0.2-1 % ophthalmic solution 1 drop  1 drop Both  Eyes QID PRN    sodium chloride (Saline Mist) 0.65 % nasal solution 1 spray  1 spray Each Nare Q3H PRN    sennosides (Senokot) tab 8.6 mg  8.6 mg Oral BID    sodium chloride tab 1 g  1 g Oral TID CC    allopurinol (Zyloprim) tab 100 mg  100 mg Oral Daily    buPROPion SR (WELLBUTRIN SR) 12 hr tab 300 mg  300 mg Oral BID    carvedilol (Coreg) tab 25 mg  25 mg Oral BID with meals    cinacalcet (Sensipar) tab 30 mg  30 mg Oral Daily    diphenhydrAMINE (Benadryl) cap/tab 25 mg  25 mg Oral Q6H PRN    gabapentin (Neurontin) cap 200 mg  200 mg Oral Nightly    nystatin (Mycostatin) 100,000 Units/g cream   Topical BID    pantoprazole (Protonix) DR tab 40 mg  40 mg Oral QAM AC    sevelamer carbonate (Renvela) tab 1,600 mg  1,600 mg Oral TID CC    lidocaine-prilocaine (Emla) 2.5-2.5 % cream   Topical PRN    ondansetron (Zofran) 4 MG/2ML injection 4 mg  4 mg Intravenous Once           Physical Exam:   /56 (BP Location: Right arm)   Pulse 58   Temp 98.4 °F (36.9 °C) (Oral)   Resp 18   Ht 5' 8\" (1.727 m)   Wt 230 lb (104.3 kg)   SpO2 100%   BMI 34.97 kg/m²   Temp (24hrs), Av.4 °F (36.9 °C), Min:98 °F (36.7 °C), Max:98.9 °F (37.2 °C)     No intake or output data in the 24 hours ending 04/10/25 0933    Last 3 Weights   25 0633 230 lb (104.3 kg)   25 0121 227 lb (103 kg)   25 0109 227 lb 11.8 oz (103.3 kg)   25 2040 149 lb 14.6 oz (68 kg)   21 1515 291 lb 0.1 oz (132 kg)   19 1118 295 lb 6.7 oz (134 kg)   18 1259 300 lb (136.1 kg)   18 1502 300 lb (136.1 kg)     General: Alert and oriented in no apparent distress.  HEENT: No scleral icterus, MMM  Neck: Supple, no BERTHA or thyromegaly  Cardiac: Regular rate and rhythm, S1, S2 normal, no murmur or rub  Lungs: Clear without wheezes, rales, rhonchi.    Abdomen: Soft, non-tender. + bowel sounds, no palpable organomegaly  Extremities: Without clubbing, cyanosis or edema. R BKA noted, left AV fistula with bruit and  thrill  Neurologic: Alert and oriented, cranial nerves grossly intact, moving all extremities  Skin: Warm and dry, no rash    Recent Labs     04/08/25  0639   WBC 10.3   HGB 8.6*   .4*   .0       Recent Labs     04/08/25  0639 04/09/25  0506   * 135*   K 4.9 4.7   CL 98 99   CO2 27.0 25.0   BUN 43* 58*   CREATSERUM 4.70* 5.76*   CA 9.6 9.6       No results for input(s): \"ALT\", \"AST\", \"ALB\", \"AMYLASE\", \"LIPASE\", \"LDH\" in the last 72 hours.    Invalid input(s): \"ALPHOS\", \"TBIL\", \"DBIL\", \"TPROT\"      Impression/Plan:      ESRD - on HD ; MWF  PAD/S/p BKA; suspect stump infection- recent tx @ OSH    - would care; vascular following   - abx  Anemia due to CKD/chronic disease  ANITA w/ HD  DM  HTN  PAD  Hx of CHF; stable         Questions/concerns were discussed with patient and/or family by bedside.    Rudolph Reyes  4/10/2025

## 2025-04-10 NOTE — CM/SW NOTE
Clarified with nurse that patient has nora cath rather than perma cath.     Spoke with Ema from [facility], they are able to administer IV abx through the nora cath.     SW/CM to remain available for support and/or discharge planning.    Addendum 2:40pm: Sent IV abx orders to [facility] on [facility].    Spoke with Marie from [facility] to discuss IV abx. They do not have Tobramycin and will need to order it. Faxed Rx and ID note.   [phone] (Phone)  [phone] (Fax)      Domi BRYANT LCSW  Discharge Planner

## 2025-04-10 NOTE — PLAN OF CARE
Patient A&Ox4. Patient resting in bed, no apparent distress. Patient resting on/off 2L NC. Patient  on tele/, vital sign monitoring.     Patient with reported headache, PRN medication given, see MAR for more details.   Wound dressing C/D/I.    QID ACHS, IV ABX, Isolation precautions, Limb precautions. Unit Draw.   Safety/fall precautions in place. Call light in reach.        Problem: Patient/Family Goals  Goal: Patient/Family Long Term Goal  Description: Patient's Long Term Goal:   Discharge back to facility  Interventions:-   Follow POC  - See additional Care Plan goals for specific interventions  Outcome: Progressing  Goal: Patient/Family Short Term Goal  Description: Patient's Short Term Goal: 4/4 NOC:Rest  4/5 NOC:rest  4/7 NOC: Sleep  4/9 noc:rest, sleep    Interventions: - cluster care  - See additional Care Plan goals for specific interventions  Outcome: Progressing     Problem: PAIN - ADULT  Goal: Verbalizes/displays adequate comfort level or patient's stated pain goal  Description: INTERVENTIONS:- Encourage pt to monitor pain and request assistance- Assess pain using appropriate pain scale- Administer analgesics based on type and severity of pain and evaluate response- Implement non-pharmacological measures as appropriate and evaluate response- Consider cultural and social influences on pain and pain management- Manage/alleviate anxiety- Utilize distraction and/or relaxation techniques- Monitor for opioid side effects- Notify MD/LIP if interventions unsuccessful or patient reports new pain- Anticipate increased pain with activity and pre-medicate as appropriate  Outcome: Progressing     Problem: GASTROINTESTINAL - ADULT  Goal: Minimal or absence of nausea and vomiting  Description: INTERVENTIONS:- Maintain adequate hydration with IV or PO as ordered and tolerated- Nasogastric tube to low intermittent suction as ordered- Evaluate effectiveness of ordered antiemetic medications- Provide nonpharmacologic  comfort measures as appropriate- Advance diet as tolerated, if ordered- Obtain nutritional consult as needed- Evaluate fluid balance  Outcome: Progressing     Problem: SKIN/TISSUE INTEGRITY - ADULT  Goal: Incision(s), wounds(s) or drain site(s) healing without S/S of infection  Description: INTERVENTIONS:- Assess and document risk factors for pressure ulcer development- Assess and document skin integrity- Assess and document dressing/incision, wound bed, drain sites and surrounding tissue- Implement wound care per orders- Initiate isolation precautions as appropriate- Initiate Pressure Ulcer prevention bundle as indicated  Outcome: Progressing

## 2025-04-10 NOTE — PROGRESS NOTES
INFECTIOUS DISEASE  PROGRESS NOTE            [facility]    Rosario Mcmullen Patient Status:  Inpatient    1955 MRN [id number]   [facility] 5NW-A Attending Merlene Duncan, DO   Hosp Day # 4 PCP Sal Montalvo MD     Antibiotics: #5  Zosyn #1  Tobra #1    Subjective:  resting    Objective:  Temp:  [98.4 °F (36.9 °C)-98.9 °F (37.2 °C)] 98.7 °F (37.1 °C)  Pulse:  [52-63] 63  Resp:  [16-18] 16  BP: (118-158)/(30-56) 126/37  SpO2:  [95 %-100 %] 96 %    General: awake alert  Vital signs:Temp:  [98.4 °F (36.9 °C)-98.9 °F (37.2 °C)] 98.7 °F (37.1 °C)  Pulse:  [52-63] 63  Resp:  [16-18] 16  BP: (118-158)/(30-56) 126/37  SpO2:  [95 %-100 %] 96 %  HEENT: Moist mucous membranes. Extraocular muscles are intact.  Neck: supple no masses  Port in place  Respiratory: Non labored, symmetric excursion, normal respirations  Cardiovascular: no irregularities in rhythm  Abdomen: Soft, nontender, nondistended.   Musculoskeletal: right BKA stump dressed    COVID-19 Lab Results    COVID-19  Lab Results   Component Value Date    COVID19 Not Detected 2025       Pro-Calcitonin  No results for input(s): \"PCT\" in the last 168 hours.    Cardiac  No results for input(s): \"TROP\", \"PBNP\" in the last 168 hours.    Creatinine Kinase  No results for input(s): \"CK\" in the last 168 hours.    Inflammatory Markers  Recent Labs   Lab 25  0637   CRP 11.60*       Recent Labs   Lab 25  0637 25  0636 25  0639   RBC 2.96*   < > 2.57*   HGB 9.6*   < > 8.6*   HCT 30.8*   < > 27.1*   .1*   < > 105.4*   MCH 32.4   < > 33.5   MCHC 31.2   < > 31.7   RDW 15.0   < > 15.0   NEPRELIM 8.19*  --   --    WBC 9.5   < > 10.3   .0   < > 199.0    < > = values in this interval not displayed.         Recent Labs   Lab 25  2148 25  0637 25  0636 25  0648 25  0639 25  0506   *   < > 95 98 108* 94   BUN 29*   < > 43* 36* 43* 58*   CREATSERUM 3.57*   < >  5.01* 3.90* 4.70* 5.76*   CA 10.5   < > 10.0 9.8 9.6 9.6   ALB 4.0  --  3.5  --   --   --       < > 135* 136 134* 135*   K 5.1   < > 5.4* 4.3 4.9 4.7   CL 99   < > 99 98 98 99   CO2 29.0   < > 28.0 29.0 27.0 25.0   ALKPHO 71  --  62  --   --   --    AST 15  --  14  --   --   --    ALT 13  --  16  --   --   --    BILT <0.2*  --  <0.2*  --   --   --    TP 6.9  --  6.1  --   --   --     < > = values in this interval not displayed.       No results found for: \"VANCT\"  Microbiology    Hospital Encounter on 04/04/25   1. Aerobic Bacterial Culture     Status: Abnormal    Collection Time: 04/07/25  2:34 PM    Specimen: Leg, right; Other   Result Value Ref Range    Aerobic Culture Result 2+ growth Pseudomonas aeruginosa (A) N/A    Aerobic Smear No WBCs seen N/A    Aerobic Smear No organisms seen N/A       Susceptibility    Pseudomonas aeruginosa -  (no method available)     Cefepime >16 Resistant      Ceftazidime >16 Resistant      Ciprofloxacin 2 Intermediate      Meropenem* >8 Resistant       * Multiple drug resistant organism (MDRO)     Levofloxacin 4 Intermediate      Piperacillin + Tazobactam* 64 Sensitive       * The current CLSI piperacillin-tazobactam susceptibility breakpoint is an CHAPO of 16 mcg/mL. MICs above this should NOT be considered susceptible. Updated susceptibility reporting is in progress.     Tobramycin <=1 Sensitive      Colistin <=2 Sensitive      Tigecycline* >4        * This antibiotic result is an CHAPO. No interpretation is currently available.     Ceftolozane/tazobactam  Resistant      Ceftazidime/avibactam  Resistant    2. Blood Culture     Status: None    Collection Time: 04/05/25  7:50 AM    Specimen: Blood,peripheral   Result Value Ref Range    Blood Culture Result No Growth 5 Days N/A         Radiology    MRI: focal marrow edema, no abscess    Problem list reviewed:  Patient Active Problem List   Diagnosis    Shortness of breath    Chest pain on breathing    Benign essential HTN    Renal  insufficiency    Type 2 diabetes mellitus with diabetic peripheral angiopathy without gangrene, without long-term current use of insulin (HCC)    Chronic diastolic (congestive) heart failure (HCC)    Morbid obesity (HCC)    Mixed hyperlipidemia    Bronchitis    Bronchospasm    Chest pain, atypical    Elevated d-dimer    Left foot pain    Type 2 diabetes mellitus with hyperglycemia, unspecified whether long term insulin use (HCC)    Anemia in ESRD (end-stage renal disease) ([facility])    Stage 4 chronic kidney disease (HCC)    ESRD (end stage renal disease) ([facility])    HTN (hypertension), benign    Preop cardiovascular exam    Cellulitis of right lower extremity    Abdominal pain of unknown etiology    ESRD on hemodialysis ([facility])    Hypertension    Hyperkalemia             ASSESSMENT/PLAN:  1. Right BKA stump wound  Non healing, was on wound vac prior to admission  -slough and green discharge superficially  -Pseudomonas isolated from preliminary culture MDRO      2. ESRD    -discharge on Zosyn + tobra (with dialysis) through BKA revision      Griffin Ragsdale MD, MD [facility]  [phone]

## 2025-04-11 VITALS
BODY MASS INDEX: 34.86 KG/M2 | DIASTOLIC BLOOD PRESSURE: 121 MMHG | HEART RATE: 64 BPM | TEMPERATURE: 99 F | OXYGEN SATURATION: 97 % | WEIGHT: 230 LBS | RESPIRATION RATE: 18 BRPM | SYSTOLIC BLOOD PRESSURE: 142 MMHG | HEIGHT: 68 IN

## 2025-04-11 LAB
ANION GAP SERPL CALC-SCNC: 8 MMOL/L (ref 0–18)
BUN BLD-MCNC: 41 MG/DL (ref 9–23)
CALCIUM BLD-MCNC: 9.8 MG/DL (ref 8.7–10.6)
CHLORIDE SERPL-SCNC: 99 MMOL/L (ref 98–112)
CO2 SERPL-SCNC: 28 MMOL/L (ref 21–32)
CREAT BLD-MCNC: 4.89 MG/DL (ref 0.55–1.02)
EGFRCR SERPLBLD CKD-EPI 2021: 9 ML/MIN/1.73M2 (ref 60–?)
GLUCOSE BLD-MCNC: 114 MG/DL (ref 70–99)
GLUCOSE BLD-MCNC: 120 MG/DL (ref 70–99)
GLUCOSE BLD-MCNC: 130 MG/DL (ref 70–99)
GLUCOSE BLD-MCNC: 96 MG/DL (ref 70–99)
MAGNESIUM SERPL-MCNC: 1.8 MG/DL (ref 1.6–2.6)
OSMOLALITY SERPL CALC.SUM OF ELEC: 290 MOSM/KG (ref 275–295)
POTASSIUM SERPL-SCNC: 4 MMOL/L (ref 3.5–5.1)
SODIUM SERPL-SCNC: 135 MMOL/L (ref 136–145)

## 2025-04-11 PROCEDURE — 90935 HEMODIALYSIS ONE EVALUATION: CPT | Performed by: INTERNAL MEDICINE

## 2025-04-11 PROCEDURE — 99239 HOSP IP/OBS DSCHRG MGMT >30: CPT | Performed by: STUDENT IN AN ORGANIZED HEALTH CARE EDUCATION/TRAINING PROGRAM

## 2025-04-11 RX ORDER — ATORVASTATIN CALCIUM 40 MG/1
1 TABLET, FILM COATED ORAL DAILY
COMMUNITY
Start: 2025-03-16

## 2025-04-11 NOTE — PROGRESS NOTES
[place]   part of [facility]     Hospitalist Progress Note     Rosario JARAMILLO Reji Mcmullen Patient Status:  Inpatient    1955 MRN [id number]   Location [facility] 5NW-A Attending Usman Paz, DO   Hosp Day # 5 PCP Sal Montalvo MD     Chief Complaint: Fever    Subjective:     Patient resting in bed with no new complaints    Objective:    Review of Systems:   A comprehensive review of systems was completed; pertinent positive and negatives stated in subjective.    Vital signs:  Temp:  [97.9 °F (36.6 °C)-98.8 °F (37.1 °C)] 97.9 °F (36.6 °C)  Pulse:  [60-63] 60  Resp:  [16-18] 18  BP: (126-178)/(35-59) 178/58  SpO2:  [93 %-97 %] 97 %    Physical Exam:    General: No acute distress  Respiratory: No wheezes, no rhonchi  Cardiovascular: S1, S2, regular rate and rhythm  Abdomen: Soft, Non-tender, non-distended, positive bowel sounds  Neuro: No new focal deficits.   Extremities: Right BKA with dressing    Diagnostic Data:    Labs:  Recent Labs   Lab 25  0637 25  0636 25  0639   WBC 13.0* 9.5 7.4 10.3   HGB 10.6* 9.6* 8.6* 8.6*   .6* 104.1* 105.6* 105.4*   .0 200.0 174.0 199.0       Recent Labs   Lab 25  2148 25  0637 25  0636 25  0648 25  0639 25  0506 25  0633   *   < > 95   < > 108* 94 96   BUN 29*   < > 43*   < > 43* 58* 41*   CREATSERUM 3.57*   < > 5.01*   < > 4.70* 5.76* 4.89*   CA 10.5   < > 10.0   < > 9.6 9.6 9.8   ALB 4.0  --  3.5  --   --   --   --       < > 135*   < > 134* 135* 135*   K 5.1   < > 5.4*   < > 4.9 4.7 4.0   CL 99   < > 99   < > 98 99 99   CO2 29.0   < > 28.0   < > 27.0 25.0 28.0   ALKPHO 71  --  62  --   --   --   --    AST 15  --  14  --   --   --   --    ALT 13  --  16  --   --   --   --    BILT <0.2*  --  <0.2*  --   --   --   --    TP 6.9  --  6.1  --   --   --   --     < > = values in this interval not displayed.       Estimated Glomerular Filtration Rate: 9  mL/min/1.73m2 (A) (result from lab).    No results for input(s): \"TROP\", \"TROPHS\", \"CK\" in the last 168 hours.    No results for input(s): \"PTP\", \"INR\" in the last 168 hours.               Microbiology    Hospital Encounter on 04/04/25   1. Aerobic Bacterial Culture     Status: Abnormal    Collection Time: 04/07/25  2:34 PM    Specimen: Leg, right; Other   Result Value Ref Range    Aerobic Culture Result 2+ growth Pseudomonas aeruginosa (A) N/A    Aerobic Smear No WBCs seen N/A    Aerobic Smear No organisms seen N/A       Susceptibility    Pseudomonas aeruginosa -  (no method available)     Cefepime >16 Resistant      Ceftazidime >16 Resistant      Ciprofloxacin 2 Intermediate      Meropenem* >8 Resistant       * Multiple drug resistant organism (MDRO)     Levofloxacin 4 Intermediate      Piperacillin + Tazobactam* 64 Sensitive       * The current CLSI piperacillin-tazobactam susceptibility breakpoint is an CHAPO of 16 mcg/mL. MICs above this should NOT be considered susceptible. Updated susceptibility reporting is in progress.     Tobramycin <=1 Sensitive      Colistin <=2 Sensitive      Tigecycline* >4        * This antibiotic result is an CHAPO. No interpretation is currently available.     Ceftolozane/tazobactam  Resistant      Ceftazidime/avibactam  Resistant    2. Blood Culture     Status: None    Collection Time: 04/05/25  7:50 AM    Specimen: Blood,peripheral   Result Value Ref Range    Blood Culture Result No Growth 5 Days N/A         Imaging: Reviewed in Epic.    Medications: Scheduled Medications[1]    Assessment & Plan:      #Suspected Right stump Cellulitis c/f possible osteomyelitis   #Sepsis 2/2 above   Patient presented with abdominal pain. COVID, Flu, RSV negative  -CT showing LLQ abdominal wall fat stranding and calcification (on exam no skin erythema here to support abdominal wall cellulitis)  -CT RLE with considerable soft tissue edema, most prominent within the stump, no walled-off fluid collection.  Possible subtle cortical irregularity of the distal tibial stump, possible osteomyelitis   -MR with focal marrow edema distal amputated margin of tibia early osteomyelitis, circumferential superficial edema around leg and edema within muscles and intermuscular fascial planes could represent cellulitis/myositis/fasciitis  -IV antibiotics  -ID following  -Wound care following     #Peripheral vascular disease  -CTA in 01/2025 with aortoiliac inflow to both lower extremities patient. Right lower with severe stenosis at origin of the profunda femoris and diffuse disease of the femoral-popliteal arteries resulting in moderate to severe diffuse stenosis. -Based on prior vascular surgery note at OSH in 01/2025, revascularization, RAKA were offered and patient decided to continue wound care and follow up with Dr. Maguire (Vascular surgeon in [place])  -CTA done here and vascular offered angiogram and profunda angioplasty with shockwave lithotripsy followed by [facility]  -Patient is not interested in procedures here and has a follow up with Dr. Maguire on 4/22/2025.     #ESRD  -Sevelamer, Cinacalcet  -Nephrology following    #DM type II  -Hyperglycemia protocol     #HTN  -Carvedilol, losartan  -Amlodipine and hydralazine discontinued     #HFpEF  -Carvedilol     #Chronic pain  -Gabapentin     #GERD  -PPI     #Mood disorder  -Bupropion     #Gout  Allopurinol    #Microcytic anemia  -Baseline hemoglobin 9.1    Discharge planning      Usman Paz DO    Supplementary Documentation:     Quality:  DVT Mechanical Prophylaxis:   SCDs,    DVT Pharmacologic Prophylaxis   Medication    heparin (Porcine) 5000 UNIT/ML injection 5,000 Units                Code Status: Full Code  Painter: No urinary catheter in place  Painter Duration (in days):   Central line:    REAGAN: 4/11/2025    Discharge is dependent on: Clinical improvement  At this point Ms. Reji Mcmullen is expected to be discharge to: TBD    The 21st Century Cures Act makes medical notes like  these available to patients in the interest of transparency. Please be advised this is a medical document. Medical documents are intended to carry relevant information, facts as evident, and the clinical opinion of the practitioner. The medical note is intended as peer to peer communication and may appear blunt or direct. It is written in medical language and may contain abbreviations or verbiage that are unfamiliar.              **Certification      PHYSICIAN Certification of Need for Inpatient Hospitalization - Initial Certification    Patient will require inpatient services that will reasonably be expected to span two midnight's based on the clinical documentation in H+P.   Based on patients current state of illness, I anticipate that, after discharge, patient will require TBD.           [1]    epoetin jaylin  10,000 Units Intravenous Once    tobramycin  2 mg/kg (Adjusted) Intravenous Once per day on Monday Wednesday Friday    piperacillin-tazobactam  4.5 g Intravenous Q12H    insulin aspart  1-68 Units Subcutaneous TID CC    insulin aspart  1-30 Units Subcutaneous TID AC and HS    multivitamin  1 tablet Oral Daily    heparin  5,000 Units Subcutaneous 2 times per day    sennosides  8.6 mg Oral BID    sodium chloride  1 g Oral TID CC    allopurinol  100 mg Oral Daily    buPROPion SR  300 mg Oral BID    carvedilol  25 mg Oral BID with meals    cinacalcet  30 mg Oral Daily    gabapentin  200 mg Oral Nightly    nystatin   Topical BID    pantoprazole  40 mg Oral QAM AC    sevelamer carbonate  1,600 mg Oral TID CC    ondansetron  4 mg Intravenous Once

## 2025-04-11 NOTE — CM/SW NOTE
Spoke with Deanne at [facility] to follow up on Tobramycin. Someone will call back about ordering it. Dr. Ragsdale confirmed there is not an alternative option.   [phone] (Phone)  [phone] (Fax)      Informed by RN that patient is medically cleared for discharge once IV abx arranged and she receives dialysis treatment. Informed Ema from [facility] of anticipated discharge today.. Spoke with [facility] and placed ambulance on will call for today. PCS form completed and available for RN to print. SW will remain available.      Addendum 11:10am: Spoke with Dr. Ragsdale who is ok with SNF providing tobramycin when she returns from dialysis treatment (since the dialysis clinic does not have it available). Spoke with Ema from [facility] who confirmed they will provide Zosyn daily and Tobra when she returns from dialysis MWF. They will arrange transport to HD on Monday. They are ready to accept patient back to day.    1:30pm: Spoke with RN, patient completing dialysis then needs IV abx infusions. Arranged ambulance  for 5pm today.       [facility]  [phone]    [facility]  [phone] or [phone]      VICKI Riggins  Discharge Planner  [phone]

## 2025-04-11 NOTE — PROGRESS NOTES
[facility]   part of [facility]     Nephrology Progress Note    Rosario ELLA Gandhililiya Patient Status:  Inpatient    1955 MRN [id number]   [facility] 5NW-A Attending Merlene Duncan, DO   Hosp Day # 5 PCP Sal Montalvo MD       SUBJECTIVE:  No acute events      Current Facility-Administered Medications   Medication Dose Route Frequency    metoclopramide (Reglan) 5 mg/mL injection 10 mg  10 mg Intravenous Daily PRN    epoetin jayiln (Epogen, Procrit) 06347 UNIT/ML injection 10,000 Units  10,000 Units Intravenous Once    tobramycin (Nebcin) 160 mg in sodium chloride 0.9% 100 mL IVPB  2 mg/kg (Adjusted) Intravenous Once per day on     piperacillin-tazobactam (Zosyn) 4.5 g in dextrose 5% 100 mL IVPB-ADDV  4.5 g Intravenous Q12H    insulin aspart (NovoLOG) 100 Units/mL FlexPen 1-68 Units  1-68 Units Subcutaneous TID CC    insulin aspart (NovoLOG) 100 Units/mL FlexPen 1-30 Units  1-30 Units Subcutaneous TID AC and HS    multivitamin (Dialyvite - Renal) tab 1 tablet  1 tablet Oral Daily    glucose (Dex4) 15 GM/59ML oral liquid 15 g  15 g Oral Q15 Min PRN    Or    glucose (Glutose) 40% oral gel 15 g  15 g Oral Q15 Min PRN    Or    glucose-vitamin C (Dex-4) chewable tab 4 tablet  4 tablet Oral Q15 Min PRN    Or    dextrose 50% injection 50 mL  50 mL Intravenous Q15 Min PRN    Or    glucose (Dex4) 15 GM/59ML oral liquid 30 g  30 g Oral Q15 Min PRN    Or    glucose (Glutose) 40% oral gel 30 g  30 g Oral Q15 Min PRN    Or    glucose-vitamin C (Dex-4) chewable tab 8 tablet  8 tablet Oral Q15 Min PRN    heparin (Porcine) 5000 UNIT/ML injection 5,000 Units  5,000 Units Subcutaneous 2 times per day    acetaminophen (Tylenol Extra Strength) tab 500 mg  500 mg Oral Q4H PRN    melatonin tab 3 mg  3 mg Oral Nightly PRN    ondansetron (Zofran) 4 MG/2ML injection 4 mg  4 mg Intravenous Q6H PRN    polyethylene glycol (PEG 3350) (Miralax) 17 g oral packet 17 g  17 g Oral Daily PRN     bisacodyl (Dulcolax) 10 MG rectal suppository 10 mg  10 mg Rectal Daily PRN    benzonatate (Tessalon) cap 200 mg  200 mg Oral TID PRN    glycerin-hypromellose- (Artificial Tears) 0.2-0.2-1 % ophthalmic solution 1 drop  1 drop Both Eyes QID PRN    sodium chloride (Saline Mist) 0.65 % nasal solution 1 spray  1 spray Each Nare Q3H PRN    sennosides (Senokot) tab 8.6 mg  8.6 mg Oral BID    sodium chloride tab 1 g  1 g Oral TID CC    allopurinol (Zyloprim) tab 100 mg  100 mg Oral Daily    buPROPion SR (WELLBUTRIN SR) 12 hr tab 300 mg  300 mg Oral BID    carvedilol (Coreg) tab 25 mg  25 mg Oral BID with meals    cinacalcet (Sensipar) tab 30 mg  30 mg Oral Daily    diphenhydrAMINE (Benadryl) cap/tab 25 mg  25 mg Oral Q6H PRN    gabapentin (Neurontin) cap 200 mg  200 mg Oral Nightly    nystatin (Mycostatin) 100,000 Units/g cream   Topical BID    pantoprazole (Protonix) DR tab 40 mg  40 mg Oral QAM AC    sevelamer carbonate (Renvela) tab 1,600 mg  1,600 mg Oral TID CC    lidocaine-prilocaine (Emla) 2.5-2.5 % cream   Topical PRN    ondansetron (Zofran) 4 MG/2ML injection 4 mg  4 mg Intravenous Once           Physical Exam:   BP (!) 178/58 (BP Location: Right arm)   Pulse 60   Temp 97.9 °F (36.6 °C) (Oral)   Resp 18   Ht 5' 8\" (1.727 m)   Wt 230 lb (104.3 kg)   SpO2 97%   BMI 34.97 kg/m²   Temp (24hrs), Av.5 °F (36.9 °C), Min:97.9 °F (36.6 °C), Max:98.8 °F (37.1 °C)       Intake/Output Summary (Last 24 hours) at 2025 0959  Last data filed at 4/10/2025 1735  Gross per 24 hour   Intake --   Output 2 ml   Net -2 ml       Last 3 Weights   25 0633 230 lb (104.3 kg)   25 0121 227 lb (103 kg)   25 0109 227 lb 11.8 oz (103.3 kg)   25 2040 149 lb 14.6 oz (68 kg)   21 1515 291 lb 0.1 oz (132 kg)   19 1118 295 lb 6.7 oz (134 kg)   18 1259 300 lb (136.1 kg)   18 1502 300 lb (136.1 kg)     General: Alert and oriented in no apparent distress.  HEENT: No scleral  icterus, MMM  Neck: Supple, no BERTHA or thyromegaly  Cardiac: Regular rate and rhythm, S1, S2 normal, no murmur or rub  Lungs: Clear without wheezes, rales, rhonchi.    Abdomen: Soft, non-tender. + bowel sounds, no palpable organomegaly  Extremities: Without clubbing, cyanosis or edema. R BKA noted, left AV fistula with bruit and thrill  Neurologic: Alert and oriented, cranial nerves grossly intact, moving all extremities  Skin: Warm and dry, no rash  No results for input(s): \"WBC\", \"HGB\", \"MCV\", \"PLT\", \"BAND\", \"INR\" in the last 72 hours.    Invalid input(s): \"LYM#\", \"MONO#\", \"BASOS#\", \"EOSIN#\"    Recent Labs     04/09/25  0506 04/11/25  0633   * 135*   K 4.7 4.0   CL 99 99   CO2 25.0 28.0   BUN 58* 41*   CREATSERUM 5.76* 4.89*   CA 9.6 9.8   MG  --  1.8       No results for input(s): \"ALT\", \"AST\", \"ALB\", \"AMYLASE\", \"LIPASE\", \"LDH\" in the last 72 hours.    Invalid input(s): \"ALPHOS\", \"TBIL\", \"DBIL\", \"TPROT\"    Impression/Plan:      ESRD - on HD ; MWF  PAD/S/p BKA; suspect stump infection- recent tx @ OSH    - would care; vascular following   - abx  Anemia due to CKD/chronic disease  ANITA w/ HD  DM  HTN  PAD  Hx of CHF; stable         Questions/concerns were discussed with patient and/or family by bedside.    Rudolph Reyes  4/11/2025

## 2025-04-11 NOTE — PROGRESS NOTES
NURSING DISCHARGE NOTE    Discharged Nursing home via Ambulance.  Accompanied by Support staff  Belongings Taken by patient/family.  Discharge instructions discussed with pt, questions and concerns addressed at bedside. Pt had HD with 3 L off today and both Zosyn and Tobramycin were administered prior to discharge. MARLA Mckenzie called with report. Pt left with paramedics at 1700.

## 2025-04-11 NOTE — PLAN OF CARE
A&OX4. Maintaining O2 on 2L at night. Tele, NSR. Denies pain. Total assist. PIV ABx. S. Regular diet. VALENCIA BKA.Port A cath R chest.Hemodialysis MWF,AV fistula QAMAR.No further needs at this time. Continue POC. Safety precaution in place.   Problem: Patient/Family Goals  Goal: Patient/Family Long Term Goal  Description: Patient's Long Term Goal:   Discharge back to facility  Interventions:-   Follow POC  - See additional Care Plan goals for specific interventions  Outcome: Progressing  Goal: Patient/Family Short Term Goal  Description: Patient's Short Term Goal: 4/4 NOC:Rest  4/5 NOC:rest  4/7 NOC: Sleep  4/9 noc:rest, sleep  4/10 NOC:sleep    Interventions: - cluster care  - See additional Care Plan goals for specific interventions  Outcome: Progressing     Problem: PAIN - ADULT  Goal: Verbalizes/displays adequate comfort level or patient's stated pain goal  Description: INTERVENTIONS:- Encourage pt to monitor pain and request assistance- Assess pain using appropriate pain scale- Administer analgesics based on type and severity of pain and evaluate response- Implement non-pharmacological measures as appropriate and evaluate response- Consider cultural and social influences on pain and pain management- Manage/alleviate anxiety- Utilize distraction and/or relaxation techniques- Monitor for opioid side effects- Notify MD/LIP if interventions unsuccessful or patient reports new pain- Anticipate increased pain with activity and pre-medicate as appropriate  Outcome: Progressing     Problem: GASTROINTESTINAL - ADULT  Goal: Minimal or absence of nausea and vomiting  Description: INTERVENTIONS:- Maintain adequate hydration with IV or PO as ordered and tolerated- Nasogastric tube to low intermittent suction as ordered- Evaluate effectiveness of ordered antiemetic medications- Provide nonpharmacologic comfort measures as appropriate- Advance diet as tolerated, if ordered- Obtain nutritional consult as needed- Evaluate fluid  balance  Outcome: Progressing     Problem: SKIN/TISSUE INTEGRITY - ADULT  Goal: Incision(s), wounds(s) or drain site(s) healing without S/S of infection  Description: INTERVENTIONS:- Assess and document risk factors for pressure ulcer development- Assess and document skin integrity- Assess and document dressing/incision, wound bed, drain sites and surrounding tissue- Implement wound care per orders- Initiate isolation precautions as appropriate- Initiate Pressure Ulcer prevention bundle as indicated  Outcome: Progressing

## 2025-04-11 NOTE — DISCHARGE SUMMARY
[facility]     Rosario Mcmullen Patient Status:  Inpatient    1955 MRN [id number]   Location [facility] 5[facility]-A Attending No att. providers found   Hosp Day # 5 PCP Sal Montalvo MD     Date of Admission: 2025  Date of Discharge:  2025     Discharge Disposition: SNF Subacute Rehab    Discharge Diagnosis:  #Suspected Right stump Cellulitis c/f possible osteomyelitis   #Sepsis 2/2 above   Patient presented with abdominal pain. COVID, Flu, RSV negative  -CT showing LLQ abdominal wall fat stranding and calcification (on exam no skin erythema here to support abdominal wall cellulitis)  -CT RLE with considerable soft tissue edema, most prominent within the stump, no walled-off fluid collection. Possible subtle cortical irregularity of the distal tibial stump, possible osteomyelitis   -MR with focal marrow edema distal amputated margin of tibia early osteomyelitis, circumferential superficial edema around leg and edema within muscles and intermuscular fascial planes could represent cellulitis/myositis/fasciitis  -IV antibiotics  -ID following  -Wound care following     #Peripheral vascular disease  -CTA in 2025 with aortoiliac inflow to both lower extremities patient. Right lower with severe stenosis at origin of the profunda femoris and diffuse disease of the femoral-popliteal arteries resulting in moderate to severe diffuse stenosis. -Based on prior vascular surgery note at OSH in 2025, revascularization, RAKA were offered and patient decided to continue wound care and follow up with Dr. Maguire (Vascular surgeon in [place])  -CTA done here and vascular offered angiogram and profunda angioplasty with shockwave lithotripsy followed by [facility]  -Patient is not interested in procedures here and has a follow up with Dr. Maguire on 2025.      #ESRD  -Sevelamer, Cinacalcet  -Nephrology following     #DM type II  -Hyperglycemia protocol     #HTN  -Carvedilol,  losartan  -Amlodipine and hydralazine discontinued     #HFpEF  -Carvedilol     #Chronic pain  -Gabapentin     #GERD  -PPI     #Mood disorder  -Bupropion     #Gout  Allopurinol     #Microcytic anemia  -Baseline hemoglobin 9.1    History of Present Illness:   Rosario Mcmullen is a 69 year old female with PMHx HTN/ DM/ HFpEF/ HLD/ ESRD (HD MWF)/ COPD who presented to the hospital for fever and emesis. History was limited as patient stated she is tired and confused from her infection.     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 She underwent debridement on 2/12/25 and a wound VAC was placed with discharge to SNF.    Brief Synopsis: CT showed LLQ abdominal wall fat stranding and calcification. CTE RLE showed soft tissue edema. Patient was started on IV antibiotics. ID was consulted. Vascular surgery offered angiogram and profunda angioplasty followed by [facility] but patient wished to follow up with her vascular surgeon in [place] for any procedures. Patient was discharged home with outpatient follow up.    Lace+ Score: 70  59-90 High Risk  29-58 Medium Risk  0-28   Low Risk       TCM Follow-Up Recommendation:  LACE > 58: High Risk of readmission after discharge from the hospital.      Procedures during hospitalization:   None    Incidental or significant findings and recommendations (brief descriptions):  See brief synopsis above     Lab/Test results pending at Discharge:   None    Consultants:  Nephro  Vascular surgery  ID    Discharge Medication List:     Discharge Medications        START taking these medications        Instructions Prescription details   sodium chloride 0.9% SOLN 100 mL with tobramycin 80 mg/2mL SOLN 160 mg      Inject 160 mg into the vein 3 (three) times a week for 14 days.   Stop taking on: April 25, 2025  Quantity: 6 Bag  Refills: 0     Zosyn 4-0.5 GM/100ML Soln  Generic  drug: Piperacillin-Tazobactam in Dex      Inject 100 mL (4.5 g total) into the vein every 12 (twelve) hours for 12 days.   Stop taking on: April 23, 2025  Quantity: 2400 mL  Refills: 0            CONTINUE taking these medications        Instructions Prescription details   atorvastatin 40 MG Tabs  Commonly known as: Lipitor      Take 1 tablet (40 mg total) by mouth daily.   Refills: 0     buPROPion  MG Tb12  Commonly known as: WELLBUTRIN SR      Take 2 tablets (300 mg total) by mouth 2 (two) times daily.   Refills: 0     carvedilol 25 MG Tabs  Commonly known as: Coreg      Take 1 tablet (25 mg total) by mouth 2 (two) times daily with meals.   Refills: 0     cinacalcet 30 MG Tabs  Commonly known as: Sensipar      Take 1 tablet (30 mg total) by mouth daily.   Refills: 0     diphenhydrAMINE 25 MG Caps  Commonly known as: Benadryl      Take 1 capsule (25 mg total) by mouth every 6 (six) hours as needed for Itching. Allergy symptoms   Refills: 0     gabapentin 100 MG Caps  Commonly known as: Neurontin      Take 2 capsules (200 mg total) by mouth nightly.   Refills: 0     lidocaine-prilocaine 2.5-2.5 % Crea  Commonly known as: Emla      Apply topically as needed.   Refills: 0     loratadine 10 MG Tbdp  Commonly known as: Claritin Reditabs      Take 1 tablet (10 mg total) by mouth daily. Every other day   Refills: 0     losartan 50 MG Tabs  Commonly known as: Cozaar      Take 2 tablets (100 mg total) by mouth daily.   Refills: 0     nystatin 100,000 Units/g Crea  Commonly known as: Mycostatin      Apply topically 2 (two) times daily.   Refills: 0     omeprazole 20 MG Cpdr  Commonly known as: PriLOSEC      Take 2 capsules (40 mg total) by mouth every morning.   Refills: 0     ondansetron 4 MG Tbdp  Commonly known as: Zofran-ODT      Take 1 tablet (4 mg total) by mouth every 8 (eight) hours as needed for Nausea.   Refills: 0     Polyethylene Glycol 3350 17 g Pack  Commonly known as: MIRALAX      Take 17 g by mouth  daily. Daily PRN   Refills: 0     Procrit 92982 UNIT/ML Soln  Generic drug: epoetin jaylin      Take 1 mL (20,000 Units total) by mouth every 14 (fourteen) days. [id number]/ml. 1ML s/c MWF given with dialysis   Refills: 0     renal vitamin Tabs      Take 1 tablet by mouth daily.   Refills: 0     sennosides 8.6 MG Tabs  Commonly known as: Senokot      Take 1 tablet (8.6 mg total) by mouth 2 (two) times daily. 2 tablet Q12H PRN   Refills: 0     Sevelamer HCl 800 MG Tabs  Commonly known as: RENAGEL      Take 2 tablets (1,600 mg total) by mouth in the morning, at noon, and at bedtime. With meals   Refills: 0     sodium chloride 1 g Tabs      Take 1 tablet (1 g total) by mouth 3 (three) times daily with meals.   Refills: 0            STOP taking these medications      amLODIPine 5 MG Tabs  Commonly known as: Norvasc        Cinnamon 500 MG Caps        cloNIDine 0.3 MG Tabs  Commonly known as: Catapres        ergocalciferol 1.25 MG (62891 UT) Caps  Commonly known as: Vitamin D2        glimepiride 2 MG Tabs  Commonly known as: Amaryl        hydrALAZINE 25 MG Tabs  Commonly known as: Apresoline        KP Aspirin 81 MG Tbec  Generic drug: aspirin        Lasix 40 MG Tabs  Generic drug: furosemide        nitrofurantoin monohydrate macro 100 MG Caps  Commonly known as: Macrobid        oxybutynin 5 MG Tabs  Commonly known as: Ditropan        Probiotic Acidophilus Caps        Singulair 10 MG Tabs  Generic drug: montelukast                  Where to Get Your Medications        Please  your prescriptions at the location directed by your doctor or nurse    Bring a paper prescription for each of these medications  sodium chloride 0.9% SOLN 100 mL with tobramycin 80 mg/2mL SOLN 160 mg  Zosyn 4-0.5 GM/100ML Soln         [place]    Follow-up appointment:   Sal Montalvo MD  [address]  [facility] [address] [phone]    Follow up in 1 week(s)      Appointments for Next 30 Days 4/12/2025 - 5/12/2025       None            Vital signs:       Physical Exam:    General: No acute distress   Lungs: clear to auscultation  Cardiovascular: S1, S2  Abdomen: Soft      -----------------------------------------------------------------------------------------------  PATIENT DISCHARGE INSTRUCTIONS: See electronic chart    Usman Paz DO    Total time spent on discharge plannin minutes     The  [employer] Cures Act makes medical notes like these available to patients in the interest of transparency. Please be advised this is a medical document. Medical documents are intended to carry relevant information, facts as evident, and the clinical opinion of the practitioner. The medical note is intended as peer to peer communication and may appear blunt or direct. It is written in medical language and may contain abbreviations or verbiage that are unfamiliar.

## 2025-04-11 NOTE — PROGRESS NOTES
INFECTIOUS DISEASE  PROGRESS NOTE            [facility]    Rosario Mcmullen Patient Status:  Inpatient    1955 MRN [id number]   [facility] 5NW-A Attending Merlene Duncan, DO   Hosp Day # 5 PCP Sal Montalvo MD     Antibiotics: #6  Zosyn #2  Tobra #2    Subjective:  Receiving dialysis    Objective:  Temp:  [97.9 °F (36.6 °C)-98.8 °F (37.1 °C)] 97.9 °F (36.6 °C)  Pulse:  [60-63] 60  Resp:  [17-18] 18  BP: (135-178)/(35-59) 178/58  SpO2:  [93 %-97 %] 97 %    General: awake alert  Vital signs:Temp:  [97.9 °F (36.6 °C)-98.8 °F (37.1 °C)] 97.9 °F (36.6 °C)  Pulse:  [60-63] 60  Resp:  [17-18] 18  BP: (135-178)/(35-59) 178/58  SpO2:  [93 %-97 %] 97 %  HEENT: Moist mucous membranes. Extraocular muscles are intact.  Neck: supple no masses  Port in place  Respiratory: Non labored, symmetric excursion, normal respirations  Cardiovascular: no irregularities in rhythm  Abdomen: Soft, nontender, nondistended.   Musculoskeletal: right BKA stump dressed    COVID-19 Lab Results    COVID-19  Lab Results   Component Value Date    COVID19 Not Detected 2025       Pro-Calcitonin  No results for input(s): \"PCT\" in the last 168 hours.    Cardiac  No results for input(s): \"TROP\", \"PBNP\" in the last 168 hours.    Creatinine Kinase  No results for input(s): \"CK\" in the last 168 hours.    Inflammatory Markers  Recent Labs   Lab 25  0637   CRP 11.60*       Recent Labs   Lab 25  0637 25  0636 25  0639   RBC 2.96*   < > 2.57*   HGB 9.6*   < > 8.6*   HCT 30.8*   < > 27.1*   .1*   < > 105.4*   MCH 32.4   < > 33.5   MCHC 31.2   < > 31.7   RDW 15.0   < > 15.0   NEPRELIM 8.19*  --   --    WBC 9.5   < > 10.3   .0   < > 199.0    < > = values in this interval not displayed.         Recent Labs   Lab 25  2148 25  0637 25  0636 25  0648 25  0639 25  0506 25  0633   *   < > 95   < > 108* 94 96   BUN 29*   < > 43*   < >  43* 58* 41*   CREATSERUM 3.57*   < > 5.01*   < > 4.70* 5.76* 4.89*   CA 10.5   < > 10.0   < > 9.6 9.6 9.8   ALB 4.0  --  3.5  --   --   --   --       < > 135*   < > 134* 135* 135*   K 5.1   < > 5.4*   < > 4.9 4.7 4.0   CL 99   < > 99   < > 98 99 99   CO2 29.0   < > 28.0   < > 27.0 25.0 28.0   ALKPHO 71  --  62  --   --   --   --    AST 15  --  14  --   --   --   --    ALT 13  --  16  --   --   --   --    BILT <0.2*  --  <0.2*  --   --   --   --    TP 6.9  --  6.1  --   --   --   --     < > = values in this interval not displayed.       No results found for: \"VANCT\"  Microbiology    Hospital Encounter on 04/04/25   1. Aerobic Bacterial Culture     Status: Abnormal    Collection Time: 04/07/25  2:34 PM    Specimen: Leg, right; Other   Result Value Ref Range    Aerobic Culture Result 2+ growth Pseudomonas aeruginosa (A) N/A    Aerobic Smear No WBCs seen N/A    Aerobic Smear No organisms seen N/A       Susceptibility    Pseudomonas aeruginosa -  (no method available)     Cefepime >16 Resistant      Ceftazidime >16 Resistant      Ciprofloxacin 2 Intermediate      Meropenem* >8 Resistant       * Multiple drug resistant organism (MDRO)     Levofloxacin 4 Intermediate      Piperacillin + Tazobactam* 64 Sensitive       * The current CLSI piperacillin-tazobactam susceptibility breakpoint is an CHAPO of 16 mcg/mL. MICs above this should NOT be considered susceptible. Updated susceptibility reporting is in progress.     Tobramycin <=1 Sensitive      Colistin <=2 Sensitive      Tigecycline* >4        * This antibiotic result is an CHAPO. No interpretation is currently available.     Ceftolozane/tazobactam  Resistant      Ceftazidime/avibactam  Resistant    2. Blood Culture     Status: None    Collection Time: 04/05/25  7:50 AM    Specimen: Blood,peripheral   Result Value Ref Range    Blood Culture Result No Growth 5 Days N/A         Radiology    MRI: focal marrow edema, no abscess    Problem list reviewed:  Patient Active  Problem List   Diagnosis    Shortness of breath    Chest pain on breathing    Benign essential HTN    Renal insufficiency    Type 2 diabetes mellitus with diabetic peripheral angiopathy without gangrene, without long-term current use of insulin (HCC)    Chronic diastolic (congestive) heart failure (HCC)    Morbid obesity (HCC)    Mixed hyperlipidemia    Bronchitis    Bronchospasm    Chest pain, atypical    Elevated d-dimer    Left foot pain    Type 2 diabetes mellitus with hyperglycemia, unspecified whether long term insulin use (HCC)    Anemia in ESRD (end-stage renal disease) ([facility])    Stage 4 chronic kidney disease (HCC)    ESRD (end stage renal disease) ([facility])    HTN (hypertension), benign    Preop cardiovascular exam    Cellulitis of right lower extremity    Abdominal pain of unknown etiology    ESRD on hemodialysis (HCC)    Hypertension    Hyperkalemia             ASSESSMENT/PLAN:  1. Right BKA stump wound  Non healing, was on wound vac prior to admission  -slough and green discharge superficially  -Pseudomonas isolated from preliminary culture MDRO      2. ESRD    -discharge on Zosyn + tobra (with dialysis) through BKA revision  DC today      Griffin Ragsdale MD, MD  [facility]  [phone]

## 2025-04-15 ENCOUNTER — LAB REQUISITION (OUTPATIENT)
Dept: LAB | Age: 70
End: 2025-04-15

## 2025-04-15 DIAGNOSIS — Z13.9 ENCOUNTER FOR SCREENING, UNSPECIFIED: ICD-10-CM

## 2025-04-15 PROCEDURE — PSEU8068 TOBRAMYCIN, RANDOM: Performed by: CLINICAL MEDICAL LABORATORY

## 2025-04-15 PROCEDURE — 80200 ASSAY OF TOBRAMYCIN: CPT | Performed by: CLINICAL MEDICAL LABORATORY

## 2025-04-16 ENCOUNTER — LAB REQUISITION (OUTPATIENT)
Dept: LAB | Age: 70
End: 2025-04-16

## 2025-04-16 DIAGNOSIS — Z13.9 ENCOUNTER FOR SCREENING, UNSPECIFIED: ICD-10-CM

## 2025-04-16 LAB — TOBRAMYCIN SERPL-MCNC: 0.5 MCG/ML (ref 0–10)

## 2025-04-16 PROCEDURE — 80200 ASSAY OF TOBRAMYCIN: CPT | Performed by: CLINICAL MEDICAL LABORATORY

## 2025-04-16 PROCEDURE — PSEU8068 TOBRAMYCIN, RANDOM: Performed by: CLINICAL MEDICAL LABORATORY

## 2025-04-17 LAB — TOBRAMYCIN SERPL-MCNC: 0.5 MCG/ML (ref 0–10)

## 2025-04-29 ENCOUNTER — LAB REQUISITION (OUTPATIENT)
Dept: LAB | Age: 70
End: 2025-04-29

## 2025-04-29 DIAGNOSIS — Z13.9 ENCOUNTER FOR SCREENING, UNSPECIFIED: ICD-10-CM

## 2025-04-29 PROCEDURE — PSEU8068 TOBRAMYCIN, RANDOM: Performed by: CLINICAL MEDICAL LABORATORY

## 2025-04-29 PROCEDURE — 80200 ASSAY OF TOBRAMYCIN: CPT | Performed by: CLINICAL MEDICAL LABORATORY

## 2025-04-30 LAB — TOBRAMYCIN SERPL-MCNC: <0.3 MCG/ML (ref 0–10)

## 2025-05-05 ENCOUNTER — LAB REQUISITION (OUTPATIENT)
Dept: LAB | Age: 70
End: 2025-05-05

## 2025-05-05 DIAGNOSIS — Z13.9 ENCOUNTER FOR SCREENING, UNSPECIFIED: ICD-10-CM

## 2025-05-05 PROCEDURE — 80200 ASSAY OF TOBRAMYCIN: CPT | Performed by: CLINICAL MEDICAL LABORATORY

## 2025-05-05 PROCEDURE — PSEU8068 TOBRAMYCIN, RANDOM: Performed by: CLINICAL MEDICAL LABORATORY

## 2025-05-06 LAB — TOBRAMYCIN SERPL-MCNC: 0.5 MCG/ML (ref 0–10)

## 2025-05-07 PROCEDURE — PSEU8068 TOBRAMYCIN, RANDOM: Performed by: CLINICAL MEDICAL LABORATORY

## 2025-05-07 PROCEDURE — 80200 ASSAY OF TOBRAMYCIN: CPT | Performed by: CLINICAL MEDICAL LABORATORY

## 2025-05-08 ENCOUNTER — LAB REQUISITION (OUTPATIENT)
Dept: LAB | Age: 70
End: 2025-05-08

## 2025-05-08 DIAGNOSIS — Z13.9 ENCOUNTER FOR SCREENING, UNSPECIFIED: ICD-10-CM

## 2025-05-09 LAB — TOBRAMYCIN SERPL-MCNC: 0.8 MCG/ML (ref 0–10)

## (undated) NOTE — LETTER
[facility]                                                       [facility] [place] [address] [id number]        Date: 5/8/2018        Patient Name: Adalgisa Santos        To Whom